# Patient Record
Sex: FEMALE | Race: WHITE | NOT HISPANIC OR LATINO | Employment: OTHER | URBAN - METROPOLITAN AREA
[De-identification: names, ages, dates, MRNs, and addresses within clinical notes are randomized per-mention and may not be internally consistent; named-entity substitution may affect disease eponyms.]

---

## 2017-02-02 ENCOUNTER — COMMUNICATION - HEALTHEAST (OUTPATIENT)
Dept: CARDIOLOGY | Facility: CLINIC | Age: 72
End: 2017-02-02

## 2017-02-20 ENCOUNTER — COMMUNICATION - HEALTHEAST (OUTPATIENT)
Dept: CARDIOLOGY | Facility: CLINIC | Age: 72
End: 2017-02-20

## 2017-02-20 DIAGNOSIS — I48.0 PAROXYSMAL ATRIAL FIBRILLATION (H): ICD-10-CM

## 2017-02-23 ENCOUNTER — AMBULATORY - HEALTHEAST (OUTPATIENT)
Dept: CARDIOLOGY | Facility: CLINIC | Age: 72
End: 2017-02-23

## 2017-03-16 ENCOUNTER — OFFICE VISIT - HEALTHEAST (OUTPATIENT)
Dept: CARDIOLOGY | Facility: CLINIC | Age: 72
End: 2017-03-16

## 2017-03-16 DIAGNOSIS — R07.2 PRECORDIAL PAIN: ICD-10-CM

## 2017-03-16 DIAGNOSIS — I48.0 PAROXYSMAL ATRIAL FIBRILLATION (H): ICD-10-CM

## 2017-05-17 ENCOUNTER — COMMUNICATION - HEALTHEAST (OUTPATIENT)
Dept: CARDIOLOGY | Facility: CLINIC | Age: 72
End: 2017-05-17

## 2017-05-20 ENCOUNTER — COMMUNICATION - HEALTHEAST (OUTPATIENT)
Dept: CARDIOLOGY | Facility: CLINIC | Age: 72
End: 2017-05-20

## 2017-05-20 DIAGNOSIS — I48.0 PAROXYSMAL ATRIAL FIBRILLATION (H): ICD-10-CM

## 2017-12-13 ENCOUNTER — COMMUNICATION - HEALTHEAST (OUTPATIENT)
Dept: CARDIOLOGY | Facility: CLINIC | Age: 72
End: 2017-12-13

## 2017-12-13 DIAGNOSIS — I48.0 PAROXYSMAL ATRIAL FIBRILLATION (H): ICD-10-CM

## 2018-03-07 ENCOUNTER — COMMUNICATION - HEALTHEAST (OUTPATIENT)
Dept: ADMINISTRATIVE | Facility: CLINIC | Age: 73
End: 2018-03-07

## 2018-03-15 ENCOUNTER — COMMUNICATION - HEALTHEAST (OUTPATIENT)
Dept: CARDIOLOGY | Facility: CLINIC | Age: 73
End: 2018-03-15

## 2018-03-15 DIAGNOSIS — I48.0 PAROXYSMAL ATRIAL FIBRILLATION (H): ICD-10-CM

## 2018-06-14 ENCOUNTER — OFFICE VISIT - HEALTHEAST (OUTPATIENT)
Dept: CARDIOLOGY | Facility: CLINIC | Age: 73
End: 2018-06-14

## 2018-06-14 ENCOUNTER — COMMUNICATION - HEALTHEAST (OUTPATIENT)
Dept: CARDIOLOGY | Facility: CLINIC | Age: 73
End: 2018-06-14

## 2018-06-14 DIAGNOSIS — I49.3 PVC'S (PREMATURE VENTRICULAR CONTRACTIONS): ICD-10-CM

## 2018-06-14 DIAGNOSIS — I05.9 MITRAL VALVE DISORDER: ICD-10-CM

## 2018-06-14 DIAGNOSIS — I48.0 PAROXYSMAL ATRIAL FIBRILLATION (H): ICD-10-CM

## 2018-06-14 ASSESSMENT — MIFFLIN-ST. JEOR: SCORE: 1137.72

## 2018-09-10 ENCOUNTER — COMMUNICATION - HEALTHEAST (OUTPATIENT)
Dept: CARDIOLOGY | Facility: CLINIC | Age: 73
End: 2018-09-10

## 2018-09-10 DIAGNOSIS — I48.0 PAROXYSMAL ATRIAL FIBRILLATION (H): ICD-10-CM

## 2019-03-13 ENCOUNTER — COMMUNICATION - HEALTHEAST (OUTPATIENT)
Dept: CARDIOLOGY | Facility: CLINIC | Age: 74
End: 2019-03-13

## 2019-03-13 DIAGNOSIS — I48.0 PAROXYSMAL ATRIAL FIBRILLATION (H): ICD-10-CM

## 2019-03-15 ENCOUNTER — COMMUNICATION - HEALTHEAST (OUTPATIENT)
Dept: CARDIOLOGY | Facility: CLINIC | Age: 74
End: 2019-03-15

## 2019-03-15 DIAGNOSIS — I05.9 MITRAL VALVE DISORDER: ICD-10-CM

## 2019-03-15 DIAGNOSIS — I49.3 PVC'S (PREMATURE VENTRICULAR CONTRACTIONS): ICD-10-CM

## 2019-03-15 DIAGNOSIS — R00.2 PALPITATIONS: ICD-10-CM

## 2019-04-03 ENCOUNTER — HOSPITAL ENCOUNTER (OUTPATIENT)
Dept: CARDIOLOGY | Facility: CLINIC | Age: 74
Discharge: HOME | End: 2019-04-03
Attending: INTERNAL MEDICINE

## 2019-04-03 DIAGNOSIS — I49.3 PVC'S (PREMATURE VENTRICULAR CONTRACTIONS): ICD-10-CM

## 2019-04-03 DIAGNOSIS — R00.2 PALPITATIONS: ICD-10-CM

## 2019-04-03 DIAGNOSIS — I05.9 MITRAL VALVE DISORDER: ICD-10-CM

## 2019-04-03 LAB
CV STRESS CURRENT BP HE: NORMAL
CV STRESS CURRENT HR HE: 100
CV STRESS CURRENT HR HE: 101
CV STRESS CURRENT HR HE: 102
CV STRESS CURRENT HR HE: 102
CV STRESS CURRENT HR HE: 106
CV STRESS CURRENT HR HE: 117
CV STRESS CURRENT HR HE: 120
CV STRESS CURRENT HR HE: 121
CV STRESS CURRENT HR HE: 121
CV STRESS CURRENT HR HE: 124
CV STRESS CURRENT HR HE: 67
CV STRESS CURRENT HR HE: 69
CV STRESS CURRENT HR HE: 69
CV STRESS CURRENT HR HE: 70
CV STRESS CURRENT HR HE: 70
CV STRESS CURRENT HR HE: 72
CV STRESS CURRENT HR HE: 74
CV STRESS CURRENT HR HE: 75
CV STRESS CURRENT HR HE: 75
CV STRESS CURRENT HR HE: 79
CV STRESS CURRENT HR HE: 79
CV STRESS CURRENT HR HE: 83
CV STRESS CURRENT HR HE: 83
CV STRESS CURRENT HR HE: 90
CV STRESS CURRENT HR HE: 91
CV STRESS DEVIATION TIME HE: NORMAL
CV STRESS ECHO PERCENT HR HE: NORMAL
CV STRESS EXERCISE STAGE HE: NORMAL
CV STRESS FINAL RESTING BP HE: NORMAL
CV STRESS FINAL RESTING HR HE: 75
CV STRESS MAX HR HE: 126
CV STRESS MAX TREADMILL GRADE HE: 16
CV STRESS MAX TREADMILL SPEED HE: 4.2
CV STRESS PEAK DIA BP HE: NORMAL
CV STRESS PEAK SYS BP HE: NORMAL
CV STRESS PHASE HE: NORMAL
CV STRESS PROTOCOL HE: NORMAL
CV STRESS RESTING PT POSITION HE: NORMAL
CV STRESS RESTING PT POSITION HE: NORMAL
CV STRESS ST DEVIATION AMOUNT HE: NORMAL
CV STRESS ST DEVIATION ELEVATION HE: NORMAL
CV STRESS ST EVELATION AMOUNT HE: NORMAL
CV STRESS TEST TYPE HE: NORMAL
CV STRESS TOTAL STAGE TIME MIN 1 HE: NORMAL
STRESS ECHO BASELINE BP: NORMAL
STRESS ECHO BASELINE HR: 65
STRESS ECHO CALCULATED PERCENT HR: 86 %
STRESS ECHO LAST STRESS BP: NORMAL
STRESS ECHO LAST STRESS HR: 124
STRESS ECHO POST ESTIMATED WORKLOAD: 10.5
STRESS ECHO POST EXERCISE DUR MIN: 9
STRESS ECHO POST EXERCISE DUR SEC: 0
STRESS ECHO TARGET HR: 125

## 2019-05-10 ENCOUNTER — COMMUNICATION - HEALTHEAST (OUTPATIENT)
Dept: ADMINISTRATIVE | Facility: CLINIC | Age: 74
End: 2019-05-10

## 2019-09-12 ENCOUNTER — COMMUNICATION - HEALTHEAST (OUTPATIENT)
Dept: CARDIOLOGY | Facility: CLINIC | Age: 74
End: 2019-09-12

## 2019-09-12 ENCOUNTER — OFFICE VISIT - HEALTHEAST (OUTPATIENT)
Dept: CARDIOLOGY | Facility: CLINIC | Age: 74
End: 2019-09-12

## 2019-09-12 DIAGNOSIS — I48.0 PAROXYSMAL ATRIAL FIBRILLATION (H): ICD-10-CM

## 2019-09-12 DIAGNOSIS — R07.2 PRECORDIAL PAIN: ICD-10-CM

## 2019-09-12 DIAGNOSIS — I05.9 MITRAL VALVE DISORDER: ICD-10-CM

## 2019-09-12 DIAGNOSIS — I49.3 PVC'S (PREMATURE VENTRICULAR CONTRACTIONS): ICD-10-CM

## 2019-10-07 ENCOUNTER — COMMUNICATION - HEALTHEAST (OUTPATIENT)
Dept: CARDIOLOGY | Facility: CLINIC | Age: 74
End: 2019-10-07

## 2019-10-24 ENCOUNTER — HOSPITAL ENCOUNTER (OUTPATIENT)
Dept: CARDIOLOGY | Facility: CLINIC | Age: 74
Discharge: HOME | End: 2019-10-24
Attending: INTERNAL MEDICINE

## 2019-10-24 DIAGNOSIS — I48.0 PAROXYSMAL ATRIAL FIBRILLATION (H): ICD-10-CM

## 2019-11-06 ENCOUNTER — COMMUNICATION - HEALTHEAST (OUTPATIENT)
Dept: CARDIOLOGY | Facility: CLINIC | Age: 74
End: 2019-11-06

## 2019-12-03 ENCOUNTER — COMMUNICATION - HEALTHEAST (OUTPATIENT)
Dept: CARDIOLOGY | Facility: CLINIC | Age: 74
End: 2019-12-03

## 2019-12-03 DIAGNOSIS — I48.0 PAROXYSMAL ATRIAL FIBRILLATION (H): ICD-10-CM

## 2020-09-11 ENCOUNTER — COMMUNICATION - HEALTHEAST (OUTPATIENT)
Dept: CARDIOLOGY | Facility: CLINIC | Age: 75
End: 2020-09-11

## 2020-09-11 DIAGNOSIS — I48.0 PAROXYSMAL ATRIAL FIBRILLATION (H): ICD-10-CM

## 2020-10-19 ENCOUNTER — OFFICE VISIT - HEALTHEAST (OUTPATIENT)
Dept: CARDIOLOGY | Facility: CLINIC | Age: 75
End: 2020-10-19

## 2020-10-19 DIAGNOSIS — I48.0 PAROXYSMAL ATRIAL FIBRILLATION (H): ICD-10-CM

## 2020-10-19 DIAGNOSIS — I05.9 MITRAL VALVE DISORDER: ICD-10-CM

## 2020-10-19 DIAGNOSIS — R07.2 PRECORDIAL PAIN: ICD-10-CM

## 2020-10-19 ASSESSMENT — MIFFLIN-ST. JEOR: SCORE: 1146.79

## 2020-12-19 ENCOUNTER — COMMUNICATION - HEALTHEAST (OUTPATIENT)
Dept: CARDIOLOGY | Facility: CLINIC | Age: 75
End: 2020-12-19

## 2020-12-19 DIAGNOSIS — I48.0 PAROXYSMAL ATRIAL FIBRILLATION (H): ICD-10-CM

## 2020-12-21 ENCOUNTER — COMMUNICATION - HEALTHEAST (OUTPATIENT)
Dept: CARDIOLOGY | Facility: CLINIC | Age: 75
End: 2020-12-21

## 2020-12-21 DIAGNOSIS — I48.0 PAROXYSMAL ATRIAL FIBRILLATION (H): ICD-10-CM

## 2020-12-21 RX ORDER — ATENOLOL 50 MG/1
TABLET ORAL
Qty: 90 TABLET | Refills: 0 | Status: SHIPPED | OUTPATIENT
Start: 2020-12-21 | End: 2023-02-16

## 2020-12-21 RX ORDER — ATENOLOL 50 MG/1
50 TABLET ORAL DAILY
Qty: 90 TABLET | Refills: 2 | Status: SHIPPED | OUTPATIENT
Start: 2020-12-21 | End: 2021-11-18

## 2021-05-30 VITALS — WEIGHT: 147 LBS | BODY MASS INDEX: 25.23 KG/M2

## 2021-06-01 VITALS — WEIGHT: 145 LBS | BODY MASS INDEX: 24.75 KG/M2 | HEIGHT: 64 IN

## 2021-06-02 NOTE — TELEPHONE ENCOUNTER
Reviewed patient's ED record. Patient seen today @ [facility] for palpitations. She was discharged with instructions to call and see if she can move her appointment for HENRY up and to update cardiologist-- she is currently scheduled for 10/24/19 to have monitor placed. Patient states that she is leaving for vacation soon and isn't able to schedule for any sooner than 10/14/19.     Dr. Verduzco, could you review patient's ED record? She currently takes Atenolol 50 mg daily as well as Aspirin 325 mg. We will try to move her HENRY up. Do you have any other recommendations in the meantime? -alonsob

## 2021-06-02 NOTE — TELEPHONE ENCOUNTER
----- Message from Nataliya Caldwell sent at 10/7/2019  2:02 PM CDT -----  General phone call:  PATIENT WAS IN ED WITH IRREGULAR HEART BEATS, SHE HAS A HENRY SCHEDULED FOR 10-24-19, SHE WAS TOLD TO LET DR KIRKLAND KNOW THIS.   WITH AN IRREGULAR HEART BEAT AGAIN, SHE TOOK HER ANTENNAL LAST NIGHT, CAN SHE TAKE ANOTHER NOW?   ALSO CAN SHE TAKE ANOTHER BABY ASPRIN TO AVOID STROKE,   PLEASE CALL  Caller: PATIENT  Primary cardiologist: DR KIRKLAND  Detailed reason for call: SEE ABOVE  New or active symptoms? YES AND NO, STABLE AT HOSPITAL AND NOW IRREGULAR AGAIN, PLEASE CALL  Best phone number: [phone]  Best time to contact: ANY TIME  Ok to leave a detailed message? YES  Device? NO    Additional Info:

## 2021-06-02 NOTE — TELEPHONE ENCOUNTER
----- Message -----  From: Jimmy Verduzco MD  Sent: 10/7/2019   9:08 PM CDT  To: Jenna Shafer RN    Can take extra half of atenolol if she has more problems while away. ER records are comforting.    Thanks,    Jimmy

## 2021-06-03 VITALS
SYSTOLIC BLOOD PRESSURE: 104 MMHG | HEART RATE: 72 BPM | DIASTOLIC BLOOD PRESSURE: 70 MMHG | BODY MASS INDEX: 24.37 KG/M2 | WEIGHT: 142 LBS | RESPIRATION RATE: 12 BRPM

## 2021-06-03 NOTE — TELEPHONE ENCOUNTER
----- Message from Bobby Greenberg sent at 11/6/2019  9:53 AM CST -----  Regarding: HENRY technical difficulty  General phone call:    Caller: Pt    Primary cardiologist: Dr Verduzco     Detailed reason for call: Pt was wearing HENRY monitor - It was having technical difficulties - Pt wanted to make us aware she completed it and sent it in - but there may have been lack of data -   Please call Pt back with results if possible    New or active symptoms? na  Best phone number: [phone]  Best time to contact: any  Ok to leave a detailed message? yes  Device? no    Additional Info:

## 2021-06-03 NOTE — PROGRESS NOTES
Event monitor placed on pt on 10/24/19, but [employer] monitoring company had an IT issue for a few days during this time and pt's event monitor was never activated.  After [employer] (f[employer]) called patient and explained, asked if pt wanted to start monitoring period over, patient declined.  I am unlinking the order with her appointment.  [employer] ([employer]) will not charge her for the monitor.  DH

## 2021-06-03 NOTE — TELEPHONE ENCOUNTER
Called patient. Advised results not yet available and we will contact once they are. Patient verbalized understanding. No other questions at this time. -refugio

## 2021-06-05 VITALS
SYSTOLIC BLOOD PRESSURE: 122 MMHG | WEIGHT: 147 LBS | BODY MASS INDEX: 25.1 KG/M2 | DIASTOLIC BLOOD PRESSURE: 80 MMHG | RESPIRATION RATE: 12 BRPM | HEIGHT: 64 IN | HEART RATE: 68 BPM

## 2021-06-11 NOTE — TELEPHONE ENCOUNTER
----- Message from Monica Singh sent at 9/11/2020  3:17 PM CDT -----  General phone call:    Caller: Pt  Primary cardiologist: Dax  Detailed reason for call: Pt needs refill auth for Atenolol- [facility]- has an appt with Dr. Verduzco on 10/19  New or active symptoms?   Best phone number:   Best time to contact:   Ok to leave a detailed message?   Device?     Additional Info:       Follow up appt in place. Medication refilled to get her to this appt. -[facility]

## 2021-06-15 PROBLEM — R07.2 PRECORDIAL PAIN: Status: ACTIVE | Noted: 2017-03-16

## 2021-06-16 PROBLEM — I48.0 PAROXYSMAL ATRIAL FIBRILLATION (H): Status: ACTIVE | Noted: 2019-09-12

## 2021-06-16 PROBLEM — I49.3 PVC'S (PREMATURE VENTRICULAR CONTRACTIONS): Status: ACTIVE | Noted: 2018-06-14

## 2021-06-19 NOTE — LETTER
Letter by Jimmy Verduzco MD at      Author: Jimmy Verduzco MD Service: -- Author Type: --    Filed:  Encounter Date: 5/10/2019 Status: (Other)         Annamarie Calvin  [address]  [place] [address]      May 10, 2019      Dear Annamarie,    This letter is to remind you that you will be due for your follow up appointment with Dr. Jimmy Verduzco . To help ensure you are in the best health possible, a regular follow-up with your cardiologist is essential.     Please call our Patient Scheduling Line at [phone] to schedule your appointment at your earliest convenience.  If you have recently scheduled an appointment, please disregard this letter.    We look forward to seeing you again. As always, we are available at the number  above for any questions or concerns you may have.      Sincerely,     The Physicians and Staff of [facility]

## 2021-06-25 NOTE — TELEPHONE ENCOUNTER
----- Message from Jac Fernandez sent at 3/15/2019 10:08 AM CDT -----  Contact: Annamarie  General phone call:    Caller: Annamarie  Primary cardiologist: Dr. Verduzco  Detailed reason for call: Patient is wondering if she should have and Echo done, she mentioned that Dr. Verduzco and her discussed this at last visit  New or active symptoms? Active  Best phone number: [phone]  Best time to contact: anytime  Ok to leave a detailed message? yes  Device? no    Additional Info:

## 2021-06-25 NOTE — TELEPHONE ENCOUNTER
Patient calling to report she would like to proceed with stress echo that you recommended at last visit on 6/2018. Patient reports she has had issues with increased shortness of breath lately.    Dr. Verduzco, okay to order stress echo?

## 2021-06-25 NOTE — PROGRESS NOTES
Progress Notes by Jimmy Verduzco MD at 3/16/2017  4:00 PM     Author: Jimmy Verduzco MD Service: -- Author Type: Physician    Filed: 3/16/2017  5:06 PM Encounter Date: 3/16/2017 Status: Signed    : Jimmy Verduzco MD (Physician)           Click to link to [facility] HEART CARE NOTE    Thank you, Dr. Villanueva, for asking us to see Annamarie Calvin at the [facility].      Assessment/Recommendations   Patient with known distant history of atrial dysrhythmias was having more palpitations some dizzy episodes and even some chest discomfort at this time.  I think we should evaluate her with a 30 day event recorder to see what rhythm disturbance is that she is noting.  I would do a stress echocardiogram to evaluate for structural heart disease and myocardial ischemia.  We will get those in the near future and report back to her with the results and any further recommendations.         History of Present Illness    Ms. Annamarie Calvin is a 71 y.o. female with known history of atrial dysrhythmias as well as mitral insufficiency which was previously judged to be moderate.  She was feeling good and has undergone multiple eye surgeries of late but is also noted some episodes of quite dramatic dizziness.  She had 2 episodes both which lasted a couple minutes where she felt unstable on her feet, felt as if the room was spinning but was not nauseated.  It went away in a couple minutes.  She did not notice palpitations.  She had sold where she developed a cold sweat after getting up early in the morning and felt very lightheaded.  She laid down on the floor so that she would pass out and she laid there for about 10 minutes.  Did not have any chest discomfort but she did notice that her pulse was irregular at that time.    She denies orthopnea, paroxysmal nocturnal dyspnea, peripheral edema, syncope or near syncopal episodes.  She has experienced intermittent  episodes of chest discomfort where she feels a pressure in her front of her chest which can go towards her back.  He can also sometimes be felt in her teeth.  That typically brought on with physical activity.         Physical Examination Review of Systems   Vitals:    03/16/17 1627   BP: 128/74   Pulse: 70     Body mass index is 25.23 kg/(m^2).  Wt Readings from Last 3 Encounters:   03/16/17 147 lb (66.7 kg)   05/14/15 143 lb (64.9 kg)     General Appearance:   Alert, cooperative and in no acute distress.   ENT/Mouth: Oral mucuos membranes pink and moist .      EYES:  No scleral icterus.  Positive xanthelasma.    Neck: JVP normal. No Hepatojugular reflux. Thyroid not visualized   Chest/Lungs:   Lungs are clear to auscultation, equal chest wall expansion    Cardiovascular:   S1, S2 with 1 to 2/6 systolic murmur , no clicks or rubs. Brachial, radial  pulses are intact and symetric. No carotid bruits noted   Abdomen:  Nontender. BS+. No bruits.      Extremities: No cyanosis, clubbing or edema   Skin: no xanthelasma, warm.    Psych: Appropriate affect.   Neurologic: normal gait, normal  bilateral, no tremors        General: WNL  Eyes: WNL  Ears/Nose/Throat: WNL  Lungs: Shortness of Breath  Heart: Irregular Heartbeat, Fainting  Stomach: WNL  Bladder: WNL  Muscle/Joints: WNL  Skin: WNL  Nervous System: Dizziness  Mental Health: WNL  Hormones: Negative  Blood: WNL     Medical History  Surgical History Family History Social History   No past medical history on file. No past surgical history on file. No family history on file. Social History     Social History   ? Marital status:      Spouse name: N/A   ? Number of children: N/A   ? Years of education: N/A     Occupational History   ? Not on file.     Social History Main Topics   ? Smoking status: Never Smoker   ? Smokeless tobacco: Never Used   ? Alcohol use Not on file   ? Drug use: Not on file   ? Sexual activity: Not on file     Other Topics Concern   ? Not on  file     Social History Narrative   ? No narrative on file          Medications  Allergies   Current Outpatient Prescriptions   Medication Sig Dispense Refill   ? aspirin 325 MG tablet Take 325 mg by mouth daily.     ? atenolol (TENORMIN) 50 MG tablet TAKE ONE TABLET BY MOUTH EVERY DAY 90 tablet 0   ? conjugated estrogens-medroxyprogesteron (PREMPHASE) 0.625 mg (14)/ 0.625mg-5mg(14) Tab Take 0.25 tablets by mouth daily.      ? ibuprofen (ADVIL,MOTRIN) 200 MG tablet Take 200 mg by mouth 2 (two) times a day.     ? VENTOLIN HFA 90 mcg/actuation inhaler        No current facility-administered medications for this visit.       Allergies   Allergen Reactions   ? Mold Other (See Comments) and Cough     Asthma.   ? Sulfamethoxazole-Trimethoprim Rash         Lab Results    Chemistry/lipid CBC Cardiac Enzymes/BNP/TSH/INR   No results found for: CHOL, HDL, LDLCALC, TRIG, CREATININE, BUN, K, NA, CL, CO2 No results found for: WBC, HGB, HCT, MCV, PLT No results found for: CKTOTAL, CKMB, CKMBINDEX, TROPONINI, BNP, TSH, INR

## 2021-06-25 NOTE — TELEPHONE ENCOUNTER
Order placed for stress echo per Dr. Verduzco. Message sent to scheduling to arrange test for patient. -ejb    ----- Message -----  From: Jimmy Verduzco MD  Sent: 3/16/2019   5:35 PM  To: Jenna Shafer RN    Yes please.     ThanksJimmy

## 2021-06-26 NOTE — PROGRESS NOTES
Progress Notes by Jimmy Verduzco MD at 6/14/2018  1:20 PM     Author: Jimmy Verduzco MD Service: -- Author Type: Physician    Filed: 6/14/2018  1:42 PM Encounter Date: 6/14/2018 Status: Signed    : Jimmy Verduzco MD (Physician)           Click to link to [facility] HEART CARE NOTE    Thank you, Dr. Villanueva, for asking us to see Annamarie Calvin at the [facility].      Assessment/Recommendations   Patient with known mild to moderate mitral insufficiency, and PVCs although is not been having any palpitations of late and believes her functional capacity is quite stable.  She may have slightly more shortness of breath with activity.  We have talked about doing a stress echocardiogram in the past and she is feeling like she does not need any further testing currently but will call us if her breathing gets worse or her palpitations get worse and will get a stress echocardiogram.    I have encouraged her to maintain an active lifestyle Reji she will.  We will see her back in 1 year, but of course would be happy to see her sooner if questions or problems arise.    Thank you for allowing us to participate in her care.         History of Present Illness    Ms. Annamarie Calvin is a 72 y.o. female with known history of premature ventricular contractions, had mild to moderate mitral regurgitation on prior echocardiogram.  She is felt well this past year.  She walks on a regular basis.  She does state that if she goes up and down the stairs multiple times she can get a little short of breath but she has some bronchospasm as well.  She thinks it may be slightly worse but not dramatically worsen and it does not impact her daily activities at all.  She denies orthopnea, paroxysmal nocturnal dyspnea, peripheral edema, syncope or near syncopal episodes.  She has not had any palpitations of late.         Physical Examination Review of Systems   Vitals:     06/14/18 1319   BP: 108/62   Pulse: 68   Resp: 12     Body mass index is 24.89 kg/(m^2).  Wt Readings from Last 3 Encounters:   06/14/18 145 lb (65.8 kg)   03/16/17 147 lb (66.7 kg)   05/14/15 143 lb (64.9 kg)     General Appearance:   Alert, cooperative and in no acute distress.   ENT/Mouth: Oral mucuos membranes pink and moist .      EYES:  No scleral icterus. No Xanthelasma.    Neck: JVP normal. No Hepatojugular reflux. Thyroid not visualized   Chest/Lungs:   Lungs are clear to auscultation, equal chest wall expansion    Cardiovascular:   S1, S2 with 1/6 systolic murmur , no clicks or rubs. Brachial, radial and posterior tibial pulses are intact and symetric. No carotid bruits noted   Abdomen:  Nontender. BS+.       Extremities: No cyanosis, clubbing or edema   Skin: no xanthelasma, warm.    Psych: Appropriate affect.   Neurologic: normal gait, normal  bilateral, no tremors        General: WNL  Eyes: WNL  Ears/Nose/Throat: WNL  Lungs: Shortness of Breath  Heart: WNL  Stomach: Diarrhea  Bladder: WNL  Muscle/Joints: WNL  Skin: WNL  Nervous System: WNL  Mental Health: WNL  Hormones: Negative  Blood: WNL     Medical History  Surgical History Family History Social History   No past medical history on file. No past surgical history on file. Family History   Problem Relation Age of Onset   ? Heart failure Mother    ? Atrial fibrillation Daughter     Social History     Social History   ? Marital status:      Spouse name: N/A   ? Number of children: N/A   ? Years of education: N/A     Occupational History   ? Not on file.     Social History Main Topics   ? Smoking status: Never Smoker   ? Smokeless tobacco: Never Used   ? Alcohol use Not on file   ? Drug use: Not on file   ? Sexual activity: Not on file     Other Topics Concern   ? Not on file     Social History Narrative          Medications  Allergies   Current Outpatient Prescriptions   Medication Sig Dispense Refill   ? aspirin 325 MG tablet Take 325 mg by  mouth daily.     ? atenolol (TENORMIN) 50 MG tablet TAKE ONE TABLET BY MOUTH EVERY DAY 90 tablet 0   ? ibuprofen (ADVIL,MOTRIN) 200 MG tablet Take 200 mg by mouth every 8 (eight) hours as needed.      ? VENTOLIN HFA 90 mcg/actuation inhaler        No current facility-administered medications for this visit.       Allergies   Allergen Reactions   ? Mold Other (See Comments) and Cough     Asthma.   ? Sulfamethoxazole-Trimethoprim Rash         Lab Results    Chemistry/lipid CBC Cardiac Enzymes/BNP/TSH/INR   No results found for: CHOL, HDL, LDLCALC, TRIG, CREATININE, BUN, K, NA, CL, CO2 No results found for: WBC, HGB, HCT, MCV, PLT No results found for: CKTOTAL, CKMB, CKMBINDEX, TROPONINI, BNP, TSH, INR

## 2021-06-28 NOTE — PROGRESS NOTES
Progress Notes by Jimmy Verduzco MD at 9/12/2019  1:40 PM     Author: Jimmy Verduzco MD Service: -- Author Type: Physician    Filed: 9/12/2019  2:04 PM Encounter Date: 9/12/2019 Status: Signed    : Jimmy Verduzco MD (Physician)           Click to link to [facility] NOTE    Thank you, Dr. Villanueva, for asking us to see Annamarie Calvin at the [facility].      Assessment/Recommendations   Patient with known history of mild to moderate mitral insufficiency which is stable but also has atrial fibrillation or presumed atrial fibrillation has been some time since we have captured anything on a monitor.  I would like to get a 2-week ACT monitor to see if she is having atrial fibrillation that is asymptomatic.  We can also evaluate for premature ventricular contractions which she has had in the past.    If she is having atrial fibrillation, we will need to discuss anticoagulation.  She does not have atrial fibrillation in the 2-week monitor we could consider getting some type of temporary monitor such as a Apple 4 watch or fit bit to see if we could get a better idea if she is having it more infrequently and for how long.  It may warrant a visit in our atrial fibrillation clinic as well.    Thank you for allowing us to participate in her care.         History of Present Illness    Ms. Annamarie Calvin is a 74 y.o. female with known history of premature ventricular contractions, mild to moderate mitral insufficiency, and paroxysmal atrial fibrillation.  We had encouraged her to have repeat echocardiogram or stress echocardiogram for some time and earlier this year she had a stress echocardiogram, going 9 minutes on a Nahun protocol without evidence of myocardial ischemia.  She stopped because of shortness of breath and did not have chest discomfort.  She had mild to moderate mitral insufficiency on the scans and did not have any evidence of  myocardial ischemia with normal and appropriate improved contractility of the left ventricle after exercise.  She does not really feel like she gets short of breath and also denies orthopnea, paroxysmal nocturnal dyspnea, peripheral edema, syncope or near syncopal episodes.  She does occasionally get atrial fibrillation.  She reports that as a irregular heartbeat and sometimes can feel little fatigued with it.  It would generally last just a few minutes but she had one recently that lasted a few hours.  She feels like she is having more stress related to some problematic relatives and she feels like this might be making it act up a bit.  She takes an aspirin today and she is not eager to take anticoagulants.  She has a chads 2 vascular score of 2 getting one point for being female and one point for being over 65.       Physical Examination Review of Systems   Vitals:    09/12/19 1331   BP: 104/70   Pulse: 72   Resp: 12     Body mass index is 24.37 kg/m .  Wt Readings from Last 3 Encounters:   09/12/19 142 lb (64.4 kg)   06/14/18 145 lb (65.8 kg)   03/16/17 147 lb (66.7 kg)     General Appearance:   Alert, cooperative and in no acute distress.   ENT/Mouth: Oral mucuos membranes pink and moist .      EYES:  No scleral icterus. No Xanthelasma.    Neck: JVP normal. No Hepatojugular reflux. Thyroid not visualized   Chest/Lungs:   Lungs are clear to auscultation, equal chest wall expansion    Cardiovascular:   S1, S2 without murmur ,clicks or rubs. Brachial, radial and posterior tibial pulses are intact and symetric. No carotid bruits noted   Abdomen:  Nontender. BS+.       Extremities: No cyanosis, clubbing or edema   Skin: no xanthelasma, warm.    Psych: Appropriate affect.   Neurologic: normal gait, normal  bilateral, no tremors        General: WNL  Eyes: WNL  Ears/Nose/Throat: WNL  Lungs: Shortness of Breath, Wheezing  Heart: Shortness of Breath with activity, Irregular Heartbeat  Stomach: WNL  Bladder:  WNL  Muscle/Joints: Joint Pain  Skin: WNL  Nervous System: WNL  Mental Health: WNL  Hormones: Negative  Blood: WNL     Medical History  Surgical History Family History Social History   No past medical history on file. No past surgical history on file. Family History   Problem Relation Age of Onset   ? Heart failure Mother    ? Atrial fibrillation Daughter     Social History     Socioeconomic History   ? Marital status:      Spouse name: Not on file   ? Number of children: Not on file   ? Years of education: Not on file   ? Highest education level: Not on file   Occupational History   ? Not on file   Social Needs   ? Financial resource strain: Not on file   ? Food insecurity:     Worry: Not on file     Inability: Not on file   ? Transportation needs:     Medical: Not on file     Non-medical: Not on file   Tobacco Use   ? Smoking status: Never Smoker   ? Smokeless tobacco: Never Used   Substance and Sexual Activity   ? Alcohol use: Not on file   ? Drug use: Not on file   ? Sexual activity: Not on file   Lifestyle   ? Physical activity:     Days per week: Not on file     Minutes per session: Not on file   ? Stress: Not on file   Relationships   ? Social connections:     Talks on phone: Not on file     Gets together: Not on file     Attends Yazidism service: Not on file     Active member of club or organization: Not on file     Attends meetings of clubs or organizations: Not on file     Relationship status: Not on file   ? Intimate partner violence:     Fear of current or ex partner: Not on file     Emotionally abused: Not on file     Physically abused: Not on file     Forced sexual activity: Not on file   Other Topics Concern   ? Not on file   Social History Narrative   ? Not on file          Medications  Allergies   Current Outpatient Medications   Medication Sig Dispense Refill   ? aspirin 325 MG tablet Take 325 mg by mouth daily.     ? atenolol (TENORMIN) 50 MG tablet TAKE ONE TABLET BY MOUTH EVERY DAY 90  tablet 0   ? ibuprofen (ADVIL,MOTRIN) 200 MG tablet Take 200 mg by mouth every 8 (eight) hours as needed.      ? VENTOLIN HFA 90 mcg/actuation inhaler        No current facility-administered medications for this visit.       Allergies   Allergen Reactions   ? Mold Other (See Comments) and Cough     Asthma.   ? Sulfamethoxazole-Trimethoprim Rash         Lab Results    Chemistry/lipid CBC Cardiac Enzymes/BNP/TSH/INR   No results found for: CHOL, HDL, LDLCALC, TRIG, CREATININE, BUN, K, NA, CL, CO2 No results found for: WBC, HGB, HCT, MCV, PLT No results found for: CKTOTAL, CKMB, CKMBINDEX, TROPONINI, BNP, TSH, INR

## 2021-06-29 NOTE — PROGRESS NOTES
Progress Notes by Jimmy Verduzco MD at 10/19/2020 12:50 PM     Author: Jimmy Verduzco MD Service: -- Author Type: Physician    Filed: 10/19/2020  1:16 PM Encounter Date: 10/19/2020 Status: Signed    : Jimmy Verduzco MD (Physician)               Assessment/Recommendations   Patient with known mitral insufficiency which was felt to be mild to moderate in the past.  The murmur is not worrisome today and her functional capacity is quite stable.  She also has had rare documentation of atrial fibrillation.  She feels like she has not had any episodes in the last 6 months.  We tried to put a 2-week ACT monitor on her last year but it did not work and she sent it back and decided not to do it.  We talked about doing that again but she would like to forego that unless she has recurrent symptoms.  I have asked her to call in if she has recurrent palpitations that are more than just fleeting and we will set her up for a 2-week ACT monitor.    We will see her back in 1 year, but of course would be happy to see her sooner if questions or problems arise.    Thank you for allowing us to participate in her care.       History of Present Illness/Subjective    Ms. Annamarie Calvin is a 75 y.o. female with known history of mild to moderate mitral insufficiency as well as paroxysmal atrial fibrillation, precordial chest discomfort, and PVCs.  She has had a good year.  She had her left knee replaced and several years ago had had her right knee replaced.  The knee replacement went well.  Her functional capacity is good.  She rode a 3 wheeled bike a lot this summer and has not had any chest discomfort or unusual shortness of breath with physical activity.  She will get some chest discomfort when she is resting and this has been going on since she was age 39.  We did a stress echocardiogram last year which was normal.    She denies orthopnea, paroxysmal nocturnal dyspnea, peripheral edema, syncope or near syncopal  episodes.  She had a episode of benign positional vertigo which was awful but it resolved.    .       Physical Examination Review of Systems   Vitals:    10/19/20 1256   BP: 122/80   Pulse: 68   Resp: 12     Body mass index is 25.23 kg/m .  Wt Readings from Last 3 Encounters:   10/19/20 147 lb (66.7 kg)   09/12/19 142 lb (64.4 kg)   06/14/18 145 lb (65.8 kg)     General Appearance:   Alert, cooperative and in no acute distress.   ENT/Mouth: Oral mucuos membranes pink and moist .      EYES:  no scleral icterus, normal conjunctivae   Neck: JVP normal. No Hepatojugular reflux. Thyroid not visualized.   Chest/Lungs:   Lungs are clear to auscultation, equal chest wall expansion.   Cardiovascular:   S1, S2 with 1/6 systolic murmur , no clicks or rubs. Brachial, radial and posterior tibial pulses are intact and symetric. No carotid bruits noted   Abdomen:  Nontender. BS+.    Extremities: No cyanosis, clubbing or edema   Skin: no xanthelasma, warm.    Neurologic: normal arm motion bilateral, no tremors     Psychiatric: Appropriate affect.      General: WNL  Eyes: WNL  Ears/Nose/Throat: WNL  Lungs: WNL  Heart: Shortness of Breath with activity  Stomach: WNL  Bladder: WNL  Muscle/Joints: WNL  Skin: WNL  Nervous System: Dizziness  Mental Health: WNL     Blood: WNL       Medical History  Surgical History Family History Social History   No past medical history on file. No past surgical history on file. Family History   Problem Relation Age of Onset   ? Heart failure Mother    ? Atrial fibrillation Daughter     Social History     Socioeconomic History   ? Marital status:      Spouse name: Not on file   ? Number of children: Not on file   ? Years of education: Not on file   ? Highest education level: Not on file   Occupational History   ? Not on file   Social Needs   ? Financial resource strain: Not on file   ? Food insecurity     Worry: Not on file     Inability: Not on file   ? Transportation needs     Medical: Not on  file     Non-medical: Not on file   Tobacco Use   ? Smoking status: Never Smoker   ? Smokeless tobacco: Never Used   Substance and Sexual Activity   ? Alcohol use: Not on file   ? Drug use: Not on file   ? Sexual activity: Not on file   Lifestyle   ? Physical activity     Days per week: Not on file     Minutes per session: Not on file   ? Stress: Not on file   Relationships   ? Social connections     Talks on phone: Not on file     Gets together: Not on file     Attends Mandaen service: Not on file     Active member of club or organization: Not on file     Attends meetings of clubs or organizations: Not on file     Relationship status: Not on file   ? Intimate partner violence     Fear of current or ex partner: Not on file     Emotionally abused: Not on file     Physically abused: Not on file     Forced sexual activity: Not on file   Other Topics Concern   ? Not on file   Social History Narrative   ? Not on file          Medications  Allergies   Current Outpatient Medications   Medication Sig Dispense Refill   ? aspirin 325 MG tablet Take 325 mg by mouth daily.     ? atenoloL (TENORMIN) 50 MG tablet Take 1 tablet (50 mg total) by mouth daily. 90 tablet 0   ? VENTOLIN HFA 90 mcg/actuation inhaler      ? ibuprofen (ADVIL,MOTRIN) 200 MG tablet Take 200 mg by mouth every 8 (eight) hours as needed.        No current facility-administered medications for this visit.     Allergies   Allergen Reactions   ? Mold Other (See Comments) and Cough     Asthma.   ? Sulfamethoxazole-Trimethoprim Rash         Lab Results    Chemistry/lipid CBC Cardiac Enzymes/BNP/TSH/INR   No results found for: CHOL, HDL, LDLCALC, TRIG, CREATININE, BUN, K, NA, CL, CO2 No results found for: WBC, HGB, HCT, MCV, PLT No results found for: CKTOTAL, CKMB, CKMBINDEX, TROPONINI, BNP, TSH, INR

## 2021-11-18 ENCOUNTER — OFFICE VISIT (OUTPATIENT)
Dept: CARDIOLOGY | Facility: CLINIC | Age: 76
End: 2021-11-18
Payer: MEDICARE

## 2021-11-18 VITALS
WEIGHT: 146.6 LBS | DIASTOLIC BLOOD PRESSURE: 62 MMHG | HEIGHT: 64 IN | BODY MASS INDEX: 25.03 KG/M2 | TEMPERATURE: 98.5 F | OXYGEN SATURATION: 96 % | SYSTOLIC BLOOD PRESSURE: 102 MMHG | RESPIRATION RATE: 12 BRPM | HEART RATE: 61 BPM

## 2021-11-18 DIAGNOSIS — I48.0 PAROXYSMAL ATRIAL FIBRILLATION (H): ICD-10-CM

## 2021-11-18 DIAGNOSIS — I05.9 MITRAL VALVE DISORDER: Primary | ICD-10-CM

## 2021-11-18 PROCEDURE — 99213 OFFICE O/P EST LOW 20 MIN: CPT | Performed by: INTERNAL MEDICINE

## 2021-11-18 RX ORDER — ATENOLOL 50 MG/1
50 TABLET ORAL DAILY
Qty: 90 TABLET | Refills: 3 | Status: SHIPPED | OUTPATIENT
Start: 2021-11-18 | End: 2022-12-28

## 2021-11-18 ASSESSMENT — MIFFLIN-ST. JEOR: SCORE: 1139.97

## 2021-11-18 NOTE — LETTER
"11/18/2021    Honey Galvez MD  [facility] [address]  [facility] [address]    RE: Annamarie Calvin       Dear Colleague,    I had the pleasure of seeing Annamarie Calvin in the [facility].        [facility]  [phone]          Assessment/Recommendations   Patient with known history of mild to moderate mitral insufficiency and palpitations all of which is stable.  She has not had any palpitations suggestive of atrial fibrillation for over a year.  Her functional capacity is good and she continues to exercise on a near daily basis.    I am not changing of her medications today.  Is been 2 years since we evaluate her mitral valve so we will get an echocardiogram.    We will see her back in 1 year, but of course would be happy to see her sooner if questions or problems arise.    Thank you for allowing us to participate in her care.       History of Present Illness/Subjective    Ms. Annamarie Calvin is a 76 year old female with known mitral insufficiency as well as a distant history of atrial fibrillation.  She has felt well this past year and feels like she had one of her better years.  She had a vertiginous episode which lasted several days but this past and is not returned.  She gets occasional palpitations but they are not not lengthy and not likely atrial fibrillation.  She has no syncope or near syncopal episodes and denies orthopnea, paroxysmal nocturnal dyspnea, chest discomfort, peripheral edema.    She  walks on an almost daily basis without difficulty.       Physical Examination Review of Systems   /62 (BP Location: Left arm, Patient Position: Sitting, Cuff Size: Adult Large)   Pulse 61   Temp 98.5  F (36.9  C) (Oral)   Resp 12   Ht 1.626 m (5' 4\")   Wt 66.5 kg (146 lb 9.6 oz)   LMP  (LMP Unknown)   SpO2 96%   Breastfeeding No   BMI 25.16 kg/m    Body mass index is 25.16 " "kg/m .  Wt Readings from Last 3 Encounters:   11/18/21 66.5 kg (146 lb 9.6 oz)   10/19/20 66.7 kg (147 lb)   09/12/19 64.4 kg (142 lb)     General Appearance:   Alert, cooperative and in no acute distress.   ENT/Mouth: Patient wearing a mask.      EYES:  no scleral icterus, normal conjunctivae   Neck: JVP normal. No Hepatojugular reflux. Thyroid not visualized.   Chest/Lungs:   Lungs are clear to auscultation, equal chest wall expansion.   Cardiovascular:   S1, S2 with 1/6 systolic murmur , no clicks or rubs. Brachial, radial  pulses are intact and symetric. No carotid bruits noted   Abdomen:  Nontender. BS+.    Extremities: No cyanosis, clubbing or edema   Skin: no xanthelasma, warm.    Neurologic: normal arm movement bilateral, no tremors     Psychiatric: Appropriate affect.      Enc Vitals  BP: 102/62  Pulse: 61  Resp: 12  Temp: 98.5  F (36.9  C)  Temp src: Oral  SpO2: 96 %  Weight: 66.5 kg (146 lb 9.6 oz)  Height: 162.6 cm (5' 4\")                                           Medical History  Surgical History Family History Social History   No past medical history on file. No past surgical history on file. Family History   Problem Relation Age of Onset     Heart Failure Mother      Atrial fibrillation Daughter     Social History     Socioeconomic History     Marital status:      Spouse name: Not on file     Number of children: Not on file     Years of education: Not on file     Highest education level: Not on file   Occupational History     Not on file   Tobacco Use     Smoking status: Never Smoker     Smokeless tobacco: Never Used   Substance and Sexual Activity     Alcohol use: Not on file     Drug use: Not on file     Sexual activity: Not on file   Other Topics Concern     Not on file   Social History Narrative     Not on file     Social Determinants of Health     Financial Resource Strain: Not on file   Food Insecurity: Not on file   Transportation Needs: Not on file   Physical Activity: Not on file "   Stress: Not on file   Social Connections: Not on file   Intimate Partner Violence: Not on file   Housing Stability: Not on file          Medications  Allergies   Current Outpatient Medications   Medication Sig Dispense Refill     aspirin 325 MG tablet [ASPIRIN 325 MG TABLET] Take 325 mg by mouth daily.       atenoloL (TENORMIN) 50 MG tablet [ATENOLOL (TENORMIN) 50 MG TABLET] Take 1 tablet (50 mg total) by mouth daily. 90 tablet 2     atenoloL (TENORMIN) 50 MG tablet [ATENOLOL (TENORMIN) 50 MG TABLET] TAKE ONE TABLET BY MOUTH EVERY DAY 90 tablet 0     VENTOLIN HFA 90 mcg/actuation inhaler [VENTOLIN HFA 90 MCG/ACTUATION INHALER]        ibuprofen (ADVIL,MOTRIN) 200 MG tablet [IBUPROFEN (ADVIL,MOTRIN) 200 MG TABLET] Take 200 mg by mouth every 8 (eight) hours as needed.  (Patient not taking: Reported on 11/18/2021)      Allergies   Allergen Reactions     Mold [Molds & Smuts] Other (See Comments) and Cough     Asthma.     Sulfamethoxazole-Trimethoprim [Sulfamethoxazole W/Trimethoprim] Rash         Lab Results    Chemistry/lipid CBC Cardiac Enzymes/BNP/TSH/INR   No results found for: CHOL, HDL, TRIG, CREATININE, BUN, NA, CO2 No results found for: WBC, HGB, HCT, MCV, PLT No results found for: CKTOTAL, CKMB, TROPONINI, BNP, TSH, INR             Thank you for allowing me to participate in the care of your patient.      Sincerely,     Jimmy Verduzco MD     [facility]  cc:   No referring provider defined for this encounter.

## 2021-11-18 NOTE — PROGRESS NOTES
"[facility]  [phone]          Assessment/Recommendations   Patient with known history of mild to moderate mitral insufficiency and palpitations all of which is stable.  She has not had any palpitations suggestive of atrial fibrillation for over a year.  Her functional capacity is good and she continues to exercise on a near daily basis.    I am not changing of her medications today.  Is been 2 years since we evaluate her mitral valve so we will get an echocardiogram.    We will see her back in 1 year, but of course would be happy to see her sooner if questions or problems arise.    Thank you for allowing us to participate in her care.       History of Present Illness/Subjective    Ms. Annamarie Calvin is a 76 year old female with known mitral insufficiency as well as a distant history of atrial fibrillation.  She has felt well this past year and feels like she had one of her better years.  She had a vertiginous episode which lasted several days but this past and is not returned.  She gets occasional palpitations but they are not not lengthy and not likely atrial fibrillation.  She has no syncope or near syncopal episodes and denies orthopnea, paroxysmal nocturnal dyspnea, chest discomfort, peripheral edema.    She  walks on an almost daily basis without difficulty.       Physical Examination Review of Systems   /62 (BP Location: Left arm, Patient Position: Sitting, Cuff Size: Adult Large)   Pulse 61   Temp 98.5  F (36.9  C) (Oral)   Resp 12   Ht 1.626 m (5' 4\")   Wt 66.5 kg (146 lb 9.6 oz)   LMP  (LMP Unknown)   SpO2 96%   Breastfeeding No   BMI 25.16 kg/m    Body mass index is 25.16 kg/m .  Wt Readings from Last 3 Encounters:   11/18/21 66.5 kg (146 lb 9.6 oz)   10/19/20 66.7 kg (147 lb)   09/12/19 64.4 kg (142 lb)     General Appearance:   Alert, cooperative and in no acute distress.   ENT/Mouth: Patient wearing a mask.      EYES:  no scleral icterus, normal " "conjunctivae   Neck: JVP normal. No Hepatojugular reflux. Thyroid not visualized.   Chest/Lungs:   Lungs are clear to auscultation, equal chest wall expansion.   Cardiovascular:   S1, S2 with 1/6 systolic murmur , no clicks or rubs. Brachial, radial  pulses are intact and symetric. No carotid bruits noted   Abdomen:  Nontender. BS+.    Extremities: No cyanosis, clubbing or edema   Skin: no xanthelasma, warm.    Neurologic: normal arm movement bilateral, no tremors     Psychiatric: Appropriate affect.      Enc Vitals  BP: 102/62  Pulse: 61  Resp: 12  Temp: 98.5  F (36.9  C)  Temp src: Oral  SpO2: 96 %  Weight: 66.5 kg (146 lb 9.6 oz)  Height: 162.6 cm (5' 4\")                                           Medical History  Surgical History Family History Social History   No past medical history on file. No past surgical history on file. Family History   Problem Relation Age of Onset     Heart Failure Mother      Atrial fibrillation Daughter     Social History     Socioeconomic History     Marital status:      Spouse name: Not on file     Number of children: Not on file     Years of education: Not on file     Highest education level: Not on file   Occupational History     Not on file   Tobacco Use     Smoking status: Never Smoker     Smokeless tobacco: Never Used   Substance and Sexual Activity     Alcohol use: Not on file     Drug use: Not on file     Sexual activity: Not on file   Other Topics Concern     Not on file   Social History Narrative     Not on file     Social Determinants of Health     Financial Resource Strain: Not on file   Food Insecurity: Not on file   Transportation Needs: Not on file   Physical Activity: Not on file   Stress: Not on file   Social Connections: Not on file   Intimate Partner Violence: Not on file   Housing Stability: Not on file          Medications  Allergies   Current Outpatient Medications   Medication Sig Dispense Refill     aspirin 325 MG tablet [ASPIRIN 325 MG TABLET] Take 325 " mg by mouth daily.       atenoloL (TENORMIN) 50 MG tablet [ATENOLOL (TENORMIN) 50 MG TABLET] Take 1 tablet (50 mg total) by mouth daily. 90 tablet 2     atenoloL (TENORMIN) 50 MG tablet [ATENOLOL (TENORMIN) 50 MG TABLET] TAKE ONE TABLET BY MOUTH EVERY DAY 90 tablet 0     VENTOLIN HFA 90 mcg/actuation inhaler [VENTOLIN HFA 90 MCG/ACTUATION INHALER]        ibuprofen (ADVIL,MOTRIN) 200 MG tablet [IBUPROFEN (ADVIL,MOTRIN) 200 MG TABLET] Take 200 mg by mouth every 8 (eight) hours as needed.  (Patient not taking: Reported on 11/18/2021)      Allergies   Allergen Reactions     Mold [Molds & Smuts] Other (See Comments) and Cough     Asthma.     Sulfamethoxazole-Trimethoprim [Sulfamethoxazole W/Trimethoprim] Rash         Lab Results    Chemistry/lipid CBC Cardiac Enzymes/BNP/TSH/INR   No results found for: CHOL, HDL, TRIG, CREATININE, BUN, NA, CO2 No results found for: WBC, HGB, HCT, MCV, PLT No results found for: CKTOTAL, CKMB, TROPONINI, BNP, TSH, INR

## 2021-12-02 ENCOUNTER — HOSPITAL ENCOUNTER (OUTPATIENT)
Dept: CARDIOLOGY | Facility: CLINIC | Age: 76
Discharge: HOME | End: 2021-12-02
Attending: INTERNAL MEDICINE | Admitting: INTERNAL MEDICINE
Payer: MEDICARE

## 2021-12-02 DIAGNOSIS — I05.9 MITRAL VALVE DISORDER: ICD-10-CM

## 2021-12-02 DIAGNOSIS — I48.0 PAROXYSMAL ATRIAL FIBRILLATION (H): ICD-10-CM

## 2021-12-02 LAB — LVEF ECHO: NORMAL

## 2021-12-02 PROCEDURE — 93306 TTE W/DOPPLER COMPLETE: CPT | Mod: 26 | Performed by: INTERNAL MEDICINE

## 2021-12-02 PROCEDURE — 93306 TTE W/DOPPLER COMPLETE: CPT

## 2022-09-26 ENCOUNTER — TRANSFERRED RECORDS (OUTPATIENT)
Dept: HEALTH INFORMATION MANAGEMENT | Facility: CLINIC | Age: 77
End: 2022-09-26

## 2022-12-28 DIAGNOSIS — I48.0 PAROXYSMAL ATRIAL FIBRILLATION (H): Primary | ICD-10-CM

## 2022-12-28 DIAGNOSIS — I05.9 MITRAL VALVE DISORDER: ICD-10-CM

## 2023-02-16 ENCOUNTER — OFFICE VISIT (OUTPATIENT)
Dept: CARDIOLOGY | Facility: CLINIC | Age: 78
End: 2023-02-16
Attending: INTERNAL MEDICINE
Payer: MEDICARE

## 2023-02-16 VITALS
DIASTOLIC BLOOD PRESSURE: 70 MMHG | TEMPERATURE: 98.1 F | BODY MASS INDEX: 26.33 KG/M2 | HEIGHT: 63 IN | WEIGHT: 148.6 LBS | OXYGEN SATURATION: 98 % | SYSTOLIC BLOOD PRESSURE: 124 MMHG | RESPIRATION RATE: 20 BRPM | HEART RATE: 62 BPM

## 2023-02-16 DIAGNOSIS — I05.9 MITRAL VALVE DISORDER: ICD-10-CM

## 2023-02-16 DIAGNOSIS — I48.0 PAROXYSMAL ATRIAL FIBRILLATION (H): ICD-10-CM

## 2023-02-16 PROBLEM — K40.90 RIGHT INGUINAL HERNIA: Status: ACTIVE | Noted: 2022-08-09

## 2023-02-16 PROCEDURE — 99213 OFFICE O/P EST LOW 20 MIN: CPT | Performed by: INTERNAL MEDICINE

## 2023-02-16 RX ORDER — GABAPENTIN 100 MG/1
CAPSULE ORAL
COMMUNITY
Start: 2022-04-25 | End: 2024-05-17

## 2023-02-16 RX ORDER — B-COMPLEX WITH VITAMIN C
1 TABLET ORAL
COMMUNITY

## 2023-02-16 RX ORDER — CHLORAL HYDRATE 500 MG
2 CAPSULE ORAL DAILY
COMMUNITY

## 2023-02-16 RX ORDER — IBUPROFEN 200 MG
200 TABLET ORAL
COMMUNITY

## 2023-02-16 RX ORDER — OXYCODONE HYDROCHLORIDE 5 MG/1
2.5-5 TABLET ORAL
COMMUNITY
Start: 2022-09-28 | End: 2024-05-17

## 2023-02-16 RX ORDER — UREA 10 %
500 LOTION (ML) TOPICAL 2 TIMES DAILY
COMMUNITY

## 2023-02-16 RX ORDER — FLAVORING AGENT
OIL (ML) MISCELLANEOUS
COMMUNITY

## 2023-02-16 RX ORDER — CALCIUM CARBONATE 500(1250)
1 TABLET ORAL 2 TIMES DAILY
COMMUNITY

## 2023-02-16 RX ORDER — ATENOLOL 50 MG/1
50 TABLET ORAL DAILY
Qty: 90 TABLET | Refills: 3 | Status: SHIPPED | OUTPATIENT
Start: 2023-02-16 | End: 2024-03-29

## 2023-02-16 ASSESSMENT — PAIN SCALES - GENERAL: PAINLEVEL: NO PAIN (0)

## 2023-02-16 NOTE — PROGRESS NOTES
"    [facility]  [phone]          Assessment/Recommendations   Patient with known history of mitral insufficiency which on last echocardiogram was judged to be mild.  She is also had palpitations and a distant history of atrial fibrillation but no symptoms of palpitation and she actually feels quite well.  She is starting a walking program and can do 2 miles without difficulty.    I have not made any changes in her current medications.  I have encouraged her to maintain an active lifestyle and to call if she has changes in her functional capacity.  We will see her back in 1 year and renew her atenolol today.    Thank you for allowing us to participate in her care.       History of Present Illness/Subjective    Ms. Annamarie Calvin is a 77 year old female with known history of mitral insufficiency, and a distant history of atrial fibrillation although no documentation for quite some time.  She has not had any significant palpitations and she feels like her palpitations are just gone away.  She feels like she has a lot of energy and did have a period in the fall where she felt more fatigued for couple months.  Thereafter she was documented with COVID but had a quick recovery after taking an antiviral.    She denies orthopnea, paroxysmal nocturnal dyspnea, peripheral edema, syncope or near syncopal episodes and does not have any chest or arm discomfort.       Physical Examination Review of Systems   /70 (BP Location: Right arm, Patient Position: Sitting, Cuff Size: Adult Regular)   Pulse 62   Temp 98.1  F (36.7  C) (Oral)   Resp 20   Ht 1.604 m (5' 3.15\")   Wt 67.4 kg (148 lb 9.6 oz)   LMP  (LMP Unknown)   SpO2 98%   BMI 26.20 kg/m    Body mass index is 26.2 kg/m .  Wt Readings from Last 3 Encounters:   02/16/23 67.4 kg (148 lb 9.6 oz)   11/18/21 66.5 kg (146 lb 9.6 oz)   10/19/20 66.7 kg (147 lb)     General Appearance:   Alert, cooperative and in no acute distress. " "  ENT/Mouth: Patient wearing a mask.      EYES:  no scleral icterus, normal conjunctivae   Neck: JVP normal. No Hepatojugular reflux. Thyroid not visualized.   Chest/Lungs:   Lungs are clear to auscultation, equal chest wall expansion.   Cardiovascular:   S1, S2 with 1/6 systolic murmur , no clicks or rubs. Brachial, radial  pulses are intact and symetric. No carotid bruits noted   Abdomen:  Nontender. BS+.   Extremities: No cyanosis, clubbing or edema   Skin: no xanthelasma, warm.    Neurologic: normal arm movement bilateral, no tremors     Psychiatric: Appropriate affect.      Enc Vitals  BP: 124/70  Pulse: 62  Resp: 20  Temp: 98.1  F (36.7  C)  Temp src: Oral  SpO2: 98 %  Weight: 67.4 kg (148 lb 9.6 oz)  Height: 160.4 cm (5' 3.15\")  Pain Score: No Pain (0)  Enc Vitals  Pain Score: No Pain (0)                                        Medical History  Surgical History Family History Social History   No past medical history on file. No past surgical history on file. Family History   Problem Relation Age of Onset     Heart Failure Mother      Atrial fibrillation Daughter     Social History     Socioeconomic History     Marital status:      Spouse name: Not on file     Number of children: Not on file     Years of education: Not on file     Highest education level: Not on file   Occupational History     Not on file   Tobacco Use     Smoking status: Never     Smokeless tobacco: Never   Substance and Sexual Activity     Alcohol use: Not on file     Drug use: Not on file     Sexual activity: Not on file   Other Topics Concern     Not on file   Social History Narrative     Not on file     Social Determinants of Health     Financial Resource Strain: Not on file   Food Insecurity: Not on file   Transportation Needs: Not on file   Physical Activity: Not on file   Stress: Not on file   Social Connections: Not on file   Intimate Partner Violence: Not on file   Housing Stability: Not on file          Medications  Allergies "   Current Outpatient Medications   Medication Sig Dispense Refill     ascorbic acid 1000 MG TABS tablet Take 1,000 mg by mouth       aspirin 325 MG tablet [ASPIRIN 325 MG TABLET] Take 325 mg by mouth daily.       atenoloL (TENORMIN) 50 MG tablet [ATENOLOL (TENORMIN) 50 MG TABLET] TAKE ONE TABLET BY MOUTH EVERY DAY 90 tablet 0     calcium carbonate (OS-ROSALIND) 500 MG tablet Take 1 tablet by mouth 2 times daily       Calcium Carbonate-Vitamin D (OYSTER SHELL CALCIUM/D) 500-5 MG-MCG TABS Take 1 tablet by mouth       diphenhydrAMINE (BENADRYL) 25 MG tablet Take 25 mg by mouth       fish oil-omega-3 fatty acids 1000 MG capsule Take 2 g by mouth daily       Flavoring Agent (PEPPERMINT FLAVOR) OIL        gabapentin (NEURONTIN) 100 MG capsule        ibuprofen (ADVIL/MOTRIN) 200 MG tablet Take 200 mg by mouth       magnesium gluconate (MAGONATE) 500 (27 Mg) MG tablet Take 500 mg by mouth 2 times daily       melatonin 5 MG tablet Take 5 mg by mouth       oxyCODONE (ROXICODONE) 5 MG tablet Take 2.5-5 mg by mouth       VENTOLIN HFA 90 mcg/actuation inhaler [VENTOLIN HFA 90 MCG/ACTUATION INHALER]        atenolol (TENORMIN) 50 MG tablet TAKE ONE (1) TABLET (50 MG) BY MOUTH DAILY 90 tablet 0     ibuprofen (ADVIL,MOTRIN) 200 MG tablet Take 200 mg by mouth every 8 hours as needed      Allergies   Allergen Reactions     Mold [Molds & Smuts] Other (See Comments) and Cough     Asthma.     Sulfamethoxazole-Trimethoprim [Sulfamethoxazole W/Trimethoprim] Rash         Lab Results    Chemistry/lipid CBC Cardiac Enzymes/BNP/TSH/INR   No results found for: CHOL, HDL, TRIG, CHOLHDL, CREATININE, BUN, NA, CO2 No results found for: WBC, HGB, HCT, MCV, PLT No results found for: CKTOTAL, CKMB, TROPONINI, BNP, TSH, INR

## 2023-02-16 NOTE — LETTER
"2/16/2023    Honey Galvez MD  [facility] [address]  [facility] [address]    RE: Annamarie Calvin       Dear Colleague,     I had the pleasure of seeing Annamarie Calvin in the [facility].      [facility]  [phone]          Assessment/Recommendations   Patient with known history of mitral insufficiency which on last echocardiogram was judged to be mild.  She is also had palpitations and a distant history of atrial fibrillation but no symptoms of palpitation and she actually feels quite well.  She is starting a walking program and can do 2 miles without difficulty.    I have not made any changes in her current medications.  I have encouraged her to maintain an active lifestyle and to call if she has changes in her functional capacity.  We will see her back in 1 year and renew her atenolol today.    Thank you for allowing us to participate in her care.       History of Present Illness/Subjective    Ms. Annamarie Calvin is a 77 year old female with known history of mitral insufficiency, and a distant history of atrial fibrillation although no documentation for quite some time.  She has not had any significant palpitations and she feels like her palpitations are just gone away.  She feels like she has a lot of energy and did have a period in the fall where she felt more fatigued for couple months.  Thereafter she was documented with COVID but had a quick recovery after taking an antiviral.    She denies orthopnea, paroxysmal nocturnal dyspnea, peripheral edema, syncope or near syncopal episodes and does not have any chest or arm discomfort.       Physical Examination Review of Systems   /70 (BP Location: Right arm, Patient Position: Sitting, Cuff Size: Adult Regular)   Pulse 62   Temp 98.1  F (36.7  C) (Oral)   Resp 20   Ht 1.604 m (5' 3.15\")   Wt 67.4 kg (148 lb 9.6 oz)   LMP  (LMP Unknown)   SpO2 98%   BMI 26.20 kg/m  " "  Body mass index is 26.2 kg/m .  Wt Readings from Last 3 Encounters:   02/16/23 67.4 kg (148 lb 9.6 oz)   11/18/21 66.5 kg (146 lb 9.6 oz)   10/19/20 66.7 kg (147 lb)     General Appearance:   Alert, cooperative and in no acute distress.   ENT/Mouth: Patient wearing a mask.      EYES:  no scleral icterus, normal conjunctivae   Neck: JVP normal. No Hepatojugular reflux. Thyroid not visualized.   Chest/Lungs:   Lungs are clear to auscultation, equal chest wall expansion.   Cardiovascular:   S1, S2 with 1/6 systolic murmur , no clicks or rubs. Brachial, radial  pulses are intact and symetric. No carotid bruits noted   Abdomen:  Nontender. BS+.   Extremities: No cyanosis, clubbing or edema   Skin: no xanthelasma, warm.    Neurologic: normal arm movement bilateral, no tremors     Psychiatric: Appropriate affect.      Enc Vitals  BP: 124/70  Pulse: 62  Resp: 20  Temp: 98.1  F (36.7  C)  Temp src: Oral  SpO2: 98 %  Weight: 67.4 kg (148 lb 9.6 oz)  Height: 160.4 cm (5' 3.15\")  Pain Score: No Pain (0)  Enc Vitals  Pain Score: No Pain (0)                                        Medical History  Surgical History Family History Social History   No past medical history on file. No past surgical history on file. Family History   Problem Relation Age of Onset     Heart Failure Mother      Atrial fibrillation Daughter     Social History     Socioeconomic History     Marital status:      Spouse name: Not on file     Number of children: Not on file     Years of education: Not on file     Highest education level: Not on file   Occupational History     Not on file   Tobacco Use     Smoking status: Never     Smokeless tobacco: Never   Substance and Sexual Activity     Alcohol use: Not on file     Drug use: Not on file     Sexual activity: Not on file   Other Topics Concern     Not on file   Social History Narrative     Not on file     Social Determinants of Health     Financial Resource Strain: Not on file   Food Insecurity: Not " on file   Transportation Needs: Not on file   Physical Activity: Not on file   Stress: Not on file   Social Connections: Not on file   Intimate Partner Violence: Not on file   Housing Stability: Not on file          Medications  Allergies   Current Outpatient Medications   Medication Sig Dispense Refill     ascorbic acid 1000 MG TABS tablet Take 1,000 mg by mouth       aspirin 325 MG tablet [ASPIRIN 325 MG TABLET] Take 325 mg by mouth daily.       atenoloL (TENORMIN) 50 MG tablet [ATENOLOL (TENORMIN) 50 MG TABLET] TAKE ONE TABLET BY MOUTH EVERY DAY 90 tablet 0     calcium carbonate (OS-ROSALIND) 500 MG tablet Take 1 tablet by mouth 2 times daily       Calcium Carbonate-Vitamin D (OYSTER SHELL CALCIUM/D) 500-5 MG-MCG TABS Take 1 tablet by mouth       diphenhydrAMINE (BENADRYL) 25 MG tablet Take 25 mg by mouth       fish oil-omega-3 fatty acids 1000 MG capsule Take 2 g by mouth daily       Flavoring Agent (PEPPERMINT FLAVOR) OIL        gabapentin (NEURONTIN) 100 MG capsule        ibuprofen (ADVIL/MOTRIN) 200 MG tablet Take 200 mg by mouth       magnesium gluconate (MAGONATE) 500 (27 Mg) MG tablet Take 500 mg by mouth 2 times daily       melatonin 5 MG tablet Take 5 mg by mouth       oxyCODONE (ROXICODONE) 5 MG tablet Take 2.5-5 mg by mouth       VENTOLIN HFA 90 mcg/actuation inhaler [VENTOLIN HFA 90 MCG/ACTUATION INHALER]        atenolol (TENORMIN) 50 MG tablet TAKE ONE (1) TABLET (50 MG) BY MOUTH DAILY 90 tablet 0     ibuprofen (ADVIL,MOTRIN) 200 MG tablet Take 200 mg by mouth every 8 hours as needed      Allergies   Allergen Reactions     Mold [Molds & Smuts] Other (See Comments) and Cough     Asthma.     Sulfamethoxazole-Trimethoprim [Sulfamethoxazole W/Trimethoprim] Rash         Lab Results    Chemistry/lipid CBC Cardiac Enzymes/BNP/TSH/INR   No results found for: CHOL, HDL, TRIG, CHOLHDL, CREATININE, BUN, NA, CO2 No results found for: WBC, HGB, HCT, MCV, PLT No results found for: CKTOTAL, CKMB, TROPONINI, BNP, TSH,  INR           Thank you for allowing me to participate in the care of your patient.      Sincerely,     Jimmy Verduzco MD     [facility]  cc:   Jimmy Verduzco MD  [address]  [place] [address]

## 2023-06-16 ENCOUNTER — TELEPHONE (OUTPATIENT)
Dept: CARDIOLOGY | Facility: CLINIC | Age: 78
End: 2023-06-16
Payer: MEDICARE

## 2023-06-16 NOTE — TELEPHONE ENCOUNTER
[facility] Call Center    Phone Message    May a detailed message be left on voicemail: yes     Reason for Call: Other: Patient called to let care team know that they did a stress test and results will be faxed over from PCP Honey Galvez's office. Patient will like for Dr. Verduzco to look at the results and see if patient really needs another stress test to be done. Please call patient back to further discuss.      Action Taken: Other: Cardiology    Travel Screening: Not Applicable     Thank you!  [facility]

## 2023-06-20 NOTE — TELEPHONE ENCOUNTER
Spoke with Pt regarding call back request, confirmed the medical records had not been received yet. Pt confirmed     Stress test completed on 06/15/23, will follow up with Pt on Monday with update. Indicated Dr. Verduzco is currently out of the office as well but will continue to review chart for new information. Once received will forward to provider. Pt expressed understanding. -ANDREIA

## 2023-06-27 NOTE — TELEPHONE ENCOUNTER
Spoke with spouse regarding call back request, Pt unavailble. Confirmed records not received. Spouse will inform pt-ANDREIA

## 2023-07-03 ENCOUNTER — TELEPHONE (OUTPATIENT)
Dept: CARDIOLOGY | Facility: CLINIC | Age: 78
End: 2023-07-03
Payer: MEDICARE

## 2023-07-03 NOTE — TELEPHONE ENCOUNTER
Spoke with Pt regarding recommendations from Dr. Verduzco, Pt expressed understanding. No new or worsening symptoms. No audible sob. Pt does not wish to proceed with nuc stress test that was ordered by pcp.  Follow up in place, pt will inform writer of any new or worsening symptoms. -ANDREIA      From: Jimmy Verduzco MD  Sent: 7/3/2023   8:44 AM CDT  To: Carmelita Mae,    It looks like a reasonable test but is if she is having shortness of breath, further testing may be warranted.  We will be happy to see her and talk more about this and get a better idea of regarding her symptoms.    Thanks,    Jimmy  ----- Message -----  From: Carmelita Pedraza  Sent: 7/3/2023   8:33 AM CDT  To: Jimmy Verduzco MD    Hi Dr. Verduzco,    The Pt's pcp ordered the stress test, pt requested your input.    Thank you,    Carmelita  ----- Message -----  From: Jimmy Verduzco MD  Sent: 7/3/2023   7:40 AM CDT  To: Carmelita Mae,    I reviewed the stress test from [employer].  The report says she did not achieve target heart rate although in the body of the report she had a peak heart rate of 123 and the target heart rate was 122.  Not sure why they said she did not reach target heart rate.  Echocardiogram heart is unremarkable which is very comforting.    To know why the stress test was ordered?  I do not believe I ordered it based on my last note.    Jimmy Valentine

## 2024-03-28 DIAGNOSIS — I48.0 PAROXYSMAL ATRIAL FIBRILLATION (H): ICD-10-CM

## 2024-03-29 RX ORDER — ATENOLOL 50 MG/1
TABLET ORAL
Qty: 90 TABLET | Refills: 1 | Status: SHIPPED | OUTPATIENT
Start: 2024-03-29 | End: 2024-09-17

## 2024-05-16 NOTE — PROGRESS NOTES
HEART CARE ENCOUNTER CONSULTATON NOTE      [facility]  [phone]      Assessment/Recommendations   Assessment:   Distant history paroxysmal atrial fibrillation, not on anticoagulation per cardiologist  - Describes episodes of palpitations lasting about an hour at most every 2 months.  Sensation of irregular rhythm.  This is unchanged for several years according to patient.  Concern for atrial fibrillation recurrence. CHADVASc 3 (age, female) discussed she would be recommended OAC for her increased risk of stroke if having atrial fibrillation.  Patient elects to continue 325 mg daily aspirin and accepts risk of stroke, did not tolerate warfarin well in the past.  Palpitations/PVCs: Stable on atenolol, magnesium   Mitral insufficiency: Mild on echo 2021, stable from 2016  Dyspnea on exertion: Intermittent, and seems related to low blood pressure in the morning hours according to patient.  Nonischemic echocardiogram stress test with baseline ECG changes 6/2023 LVEF 60%  Cramps in legs: Multiple nights a week, not similar to claudication pain    Plan:   Discussed [employer] versus Apple Watch monitoring with extended palpitations.  Concern for recurrent A-fib. With infrequent symptoms cardiac rhythm monitor may not find answers. Patient would likely elect to to continue aspirin 325 mg once with recurrence daily despite conversation regarding increased risk.  Briefly discussed DOAC versus warfarin options.  Continue atenolol 50 mg daily for palpitations/PVCs, discussed half dose of 25 mg twice daily if patient is experiencing low blood pressure.  Update BMP/Mg with report of cramps   Continue exercise, stay hydrated        Follow up in 1 year or sooner sasneeded     History of Present Illness/Subjective    HPI: Annamarie NOLVIA Calvin is a 78 year old female with PMHx of distant history AF, palpitations/PVCs, mitral insufficiency, cramps in legs presents for follow up.    Patient reports no  significant changes over the last year.  Continues to have occasional palpitations, and can feel an irregular pulse on her risk intermittently.  She also describes episodes of sensation of irregular heartbeat, sensation in chest/stomach without known trigger, can happen at rest, occurring about every 2 months or less.  Longest episode has lasted for the whole day but this is very rare.  Historically with atrial fibrillation, apparently diagnosed in her 30s, she felt incredibly weak and fatigued.  There is no mention of these episodes at visit last year, but patient says they have been the same and stable for many years.  Palpitations may even be better than a year ago on atenolol and magnesium supplementation.  Felt poorly when taking warfarin in the past.  Send daily full dose aspirin.    Dyspnea on exertion and diagnostic echo stress test 6/2023 to baseline ECG changes and failure to achieve target heart rate.  LVEF 60% and no WMA at rest or after stress.  Experience shortness of breath with stress.  When reviewed by Dr. Verduzco and that she did not achieve target heart rate of 122, with peak heart rate that 123.  Since patient reports some shortness of breath usually occurring in the mornings about every 10 days, and has since low blood pressure at this time.  He takes atenolol at night.    Scribes intermittent leg cramps at night when laying in bed.  Drinks 5 to 6 glasses of water a day.  Denies claudication symptoms with exertion/exercise.  She exercises regularly with walking.    She denies dyspnea on exertion, orthopnea, PND, chest pain, and lower extremity edema.      Echocardiogram 12/2021 Results:  The left ventricle is normal in size.  The visual ejection fraction is 55-60%.  Normal left ventricular wall motion  Normal right ventricle size and systolic function.  There is mild (1+) tricuspid regurgitation.  The right ventricular systolic pressure is approximated at 27mmHg plus the  right atrial  pressure.  IVC diameter <2.1 cm collapsing >50% with sniff suggests a normal RA pressure  of 3 mmHg.  There is no pericardial effusion.  Sinus rhythm was noted.     Physical Examination  Review of Systems   Vitals: /70 (BP Location: Left arm, Patient Position: Sitting, Cuff Size: Adult Regular)   Pulse 67   Resp 16   Wt 67 kg (147 lb 12.8 oz)   LMP  (LMP Unknown)   SpO2 97%   BMI 26.06 kg/m    BMI= Body mass index is 26.06 kg/m .  Wt Readings from Last 3 Encounters:   05/17/24 67 kg (147 lb 12.8 oz)   02/16/23 67.4 kg (148 lb 9.6 oz)   11/18/21 66.5 kg (146 lb 9.6 oz)           ENT/Mouth: membranes moist, no oral lesions or bleeding gums.              Chest/Lungs:   lungs are clear to auscultation, no rales or wheezing, equal chest wall expansion    Cardiovascular:   Regular. Normal first and second heart sounds with no murmurs, rubs, or gallops; the  radial and posterior tibial pulses are intact, absent edema bilaterally        Extremities: no cyanosis or clubbing   Skin: no xanthelasma, warm.    Neurologic: no tremors     Psychiatric: alert and oriented x3, calm        Please refer above for cardiac ROS details.        Medical History  Surgical History Family History Social History   No past medical history on file.  No past surgical history on file.  Family History   Problem Relation Age of Onset    Heart Failure Mother     Atrial fibrillation Daughter         Social History     Socioeconomic History    Marital status:      Spouse name: Not on file    Number of children: Not on file    Years of education: Not on file    Highest education level: Not on file   Occupational History    Not on file   Tobacco Use    Smoking status: Never    Smokeless tobacco: Never   Substance and Sexual Activity    Alcohol use: Not on file    Drug use: Not on file    Sexual activity: Not on file   Other Topics Concern    Not on file   Social History Narrative    Not on file     Social Determinants of Health  "    Financial Resource Strain: Not on file   Food Insecurity: Not on file   Transportation Needs: Not on file   Physical Activity: Not on file   Stress: Not on file   Social Connections: Not on file   Interpersonal Safety: Not on file   Housing Stability: Not on file           Medications  Allergies   Current Outpatient Medications   Medication Sig Dispense Refill    ascorbic acid 1000 MG TABS tablet Take 1,000 mg by mouth      aspirin 325 MG tablet [ASPIRIN 325 MG TABLET] Take 325 mg by mouth daily.      atenolol (TENORMIN) 50 MG tablet TAKE ONE (1) TABLET (50 MG) BY MOUTH DAILY 90 tablet 1    calcium carbonate (OS-ROSALIND) 500 MG tablet Take 1 tablet by mouth 2 times daily      Calcium Carbonate-Vitamin D (OYSTER SHELL CALCIUM/D) 500-5 MG-MCG TABS Take 1 tablet by mouth      diphenhydrAMINE (BENADRYL) 25 MG tablet Take 25 mg by mouth      fish oil-omega-3 fatty acids 1000 MG capsule Take 2 g by mouth daily      Flavoring Agent (PEPPERMINT FLAVOR) OIL       ibuprofen (ADVIL,MOTRIN) 200 MG tablet Take 200 mg by mouth every 8 hours as needed      ibuprofen (ADVIL/MOTRIN) 200 MG tablet Take 200 mg by mouth      magnesium gluconate (MAGONATE) 500 (27 Mg) MG tablet Take 500 mg by mouth 2 times daily      melatonin 5 MG tablet Take 5 mg by mouth      VENTOLIN HFA 90 mcg/actuation inhaler [VENTOLIN HFA 90 MCG/ACTUATION INHALER]       gabapentin (NEURONTIN) 100 MG capsule  (Patient not taking: Reported on 5/17/2024)      oxyCODONE (ROXICODONE) 5 MG tablet Take 2.5-5 mg by mouth (Patient not taking: Reported on 5/17/2024)         Allergies   Allergen Reactions    Mold [Molds & Smuts] Other (See Comments) and Cough     Asthma.    Sulfamethoxazole-Trimethoprim [Sulfamethoxazole-Trimethoprim] Rash          Lab Results    Chemistry/lipid CBC Cardiac Enzymes/BNP/TSH/INR   No results for input(s): \"CHOL\", \"HDL\", \"LDL\", \"TRIG\", \"CHOLHDLRATIO\" in the last 49202 hours.  No results for input(s): \"LDL\" in the last 60559 hours.  No results " "for input(s): \"NA\", \"POTASSIUM\", \"CHLORIDE\", \"CO2\", \"GLC\", \"BUN\", \"CR\", \"GFRESTIMATED\", \"ROSALIND\" in the last 82041 hours.    Invalid input(s): \"GRFESTBLACK\"  No results for input(s): \"CR\" in the last 85307 hours.  No results for input(s): \"A1C\" in the last 65345 hours.       No results for input(s): \"WBC\", \"HGB\", \"HCT\", \"MCV\", \"PLT\" in the last 96804 hours.  No results for input(s): \"HGB\" in the last 06786 hours. No results for input(s): \"TROPONINI\" in the last 76825 hours.  No results for input(s): \"BNP\", \"NTBNPI\", \"NTBNP\" in the last 64481 hours.  No results for input(s): \"TSH\" in the last 26995 hours.  No results for input(s): \"INR\" in the last 51042 hours.       This note has been dictated using voice recognition software. Any grammatical, typographical, or context distortions are unintentional and inherent to the software    Juanis Starr PA-C                                       "

## 2024-05-17 ENCOUNTER — OFFICE VISIT (OUTPATIENT)
Dept: CARDIOLOGY | Facility: CLINIC | Age: 79
End: 2024-05-17
Payer: MEDICARE

## 2024-05-17 VITALS
OXYGEN SATURATION: 97 % | HEART RATE: 67 BPM | RESPIRATION RATE: 16 BRPM | BODY MASS INDEX: 26.06 KG/M2 | WEIGHT: 147.8 LBS | SYSTOLIC BLOOD PRESSURE: 106 MMHG | DIASTOLIC BLOOD PRESSURE: 70 MMHG

## 2024-05-17 DIAGNOSIS — I48.0 PAROXYSMAL ATRIAL FIBRILLATION (H): ICD-10-CM

## 2024-05-17 DIAGNOSIS — R25.2 CRAMP OF LIMB: ICD-10-CM

## 2024-05-17 DIAGNOSIS — I49.3 PVC'S (PREMATURE VENTRICULAR CONTRACTIONS): Primary | ICD-10-CM

## 2024-05-17 DIAGNOSIS — I05.9 MITRAL VALVE DISORDER: ICD-10-CM

## 2024-05-17 DIAGNOSIS — R00.2 PALPITATIONS: ICD-10-CM

## 2024-05-17 LAB
ANION GAP SERPL CALCULATED.3IONS-SCNC: 6 MMOL/L (ref 7–15)
BUN SERPL-MCNC: 14.6 MG/DL (ref 8–23)
CALCIUM SERPL-MCNC: 9.5 MG/DL (ref 8.8–10.2)
CHLORIDE SERPL-SCNC: 104 MMOL/L (ref 98–107)
CREAT SERPL-MCNC: 0.74 MG/DL (ref 0.51–0.95)
DEPRECATED HCO3 PLAS-SCNC: 27 MMOL/L (ref 22–29)
EGFRCR SERPLBLD CKD-EPI 2021: 82 ML/MIN/1.73M2
GLUCOSE SERPL-MCNC: 91 MG/DL (ref 70–99)
MAGNESIUM SERPL-MCNC: 2.4 MG/DL (ref 1.7–2.3)
POTASSIUM SERPL-SCNC: 4.8 MMOL/L (ref 3.4–5.3)
SODIUM SERPL-SCNC: 137 MMOL/L (ref 135–145)

## 2024-05-17 PROCEDURE — 36415 COLL VENOUS BLD VENIPUNCTURE: CPT

## 2024-05-17 PROCEDURE — 83735 ASSAY OF MAGNESIUM: CPT

## 2024-05-17 PROCEDURE — 99204 OFFICE O/P NEW MOD 45 MIN: CPT

## 2024-05-17 PROCEDURE — 80048 BASIC METABOLIC PNL TOTAL CA: CPT

## 2024-05-17 NOTE — PATIENT INSTRUCTIONS
It was a pleasure taking part in your care today:    - Continue atenolol 50 mg once daily. Can take half a pill (25 mg) twice daily if having low blood pressure  - [employer] or Apple watch, to monitor for atrial fibrillation with palpitations every two months   - Continue to monitor palpitations for increased frequency     Please call the [facility] clinic with any questions or concerns at [phone].     Juanis Starr PA-C

## 2024-05-17 NOTE — LETTER
5/17/2024    Honey Galvez MD  [address] [facility] [address]    RE: Annamarie Calvin       Dear Colleague,     I had the pleasure of seeing Annamarie Calvin in the [facility].    HEART CARE ENCOUNTER CONSULTATON NOTE      [facility]  [phone]      Assessment/Recommendations   Assessment:   Distant history paroxysmal atrial fibrillation, not on anticoagulation per cardiologist  - Describes episodes of palpitations lasting about an hour at most every 2 months.  Sensation of irregular rhythm.  This is unchanged for several years according to patient.  Concern for atrial fibrillation recurrence. CHADVASc 3 (age, female) discussed she would be recommended OAC for her increased risk of stroke if having atrial fibrillation.  Patient elects to continue 325 mg daily aspirin and accepts risk of stroke, did not tolerate warfarin well in the past.  Palpitations/PVCs: Stable on atenolol, magnesium   Mitral insufficiency: Mild on echo 2021, stable from 2016  Dyspnea on exertion: Intermittent, and seems related to low blood pressure in the morning hours according to patient.  Nonischemic echocardiogram stress test with baseline ECG changes 6/2023 LVEF 60%  Cramps in legs: Multiple nights a week, not similar to claudication pain    Plan:   Discussed [employer] versus Apple Watch monitoring with extended palpitations.  Concern for recurrent A-fib. With infrequent symptoms cardiac rhythm monitor may not find answers. Patient would likely elect to to continue aspirin 325 mg once with recurrence daily despite conversation regarding increased risk.  Briefly discussed DOAC versus warfarin options.  Continue atenolol 50 mg daily for palpitations/PVCs, discussed half dose of 25 mg twice daily if patient is experiencing low blood pressure.  Update BMP/Mg with report of cramps   Continue exercise, stay hydrated        Follow up in 1 year or sooner sasneeded     History  of Present Illness/Subjective    HPI: Annamarie Calvin is a 78 year old female with PMHx of distant history AF, palpitations/PVCs, mitral insufficiency, cramps in legs presents for follow up.    Patient reports no significant changes over the last year.  Continues to have occasional palpitations, and can feel an irregular pulse on her risk intermittently.  She also describes episodes of sensation of irregular heartbeat, sensation in chest/stomach without known trigger, can happen at rest, occurring about every 2 months or less.  Longest episode has lasted for the whole day but this is very rare.  Historically with atrial fibrillation, apparently diagnosed in her 30s, she felt incredibly weak and fatigued.  There is no mention of these episodes at visit last year, but patient says they have been the same and stable for many years.  Palpitations may even be better than a year ago on atenolol and magnesium supplementation.  Felt poorly when taking warfarin in the past.  Send daily full dose aspirin.    Dyspnea on exertion and diagnostic echo stress test 6/2023 to baseline ECG changes and failure to achieve target heart rate.  LVEF 60% and no WMA at rest or after stress.  Experience shortness of breath with stress.  When reviewed by Dr. Verduzco and that she did not achieve target heart rate of 122, with peak heart rate that 123.  Since patient reports some shortness of breath usually occurring in the mornings about every 10 days, and has since low blood pressure at this time.  He takes atenolol at night.    Scribes intermittent leg cramps at night when laying in bed.  Drinks 5 to 6 glasses of water a day.  Denies claudication symptoms with exertion/exercise.  She exercises regularly with walking.    She denies dyspnea on exertion, orthopnea, PND, chest pain, and lower extremity edema.      Echocardiogram 12/2021 Results:  The left ventricle is normal in size.  The visual ejection fraction is 55-60%.  Normal left  ventricular wall motion  Normal right ventricle size and systolic function.  There is mild (1+) tricuspid regurgitation.  The right ventricular systolic pressure is approximated at 27mmHg plus the  right atrial pressure.  IVC diameter <2.1 cm collapsing >50% with sniff suggests a normal RA pressure  of 3 mmHg.  There is no pericardial effusion.  Sinus rhythm was noted.     Physical Examination  Review of Systems   Vitals: /70 (BP Location: Left arm, Patient Position: Sitting, Cuff Size: Adult Regular)   Pulse 67   Resp 16   Wt 67 kg (147 lb 12.8 oz)   LMP  (LMP Unknown)   SpO2 97%   BMI 26.06 kg/m    BMI= Body mass index is 26.06 kg/m .  Wt Readings from Last 3 Encounters:   05/17/24 67 kg (147 lb 12.8 oz)   02/16/23 67.4 kg (148 lb 9.6 oz)   11/18/21 66.5 kg (146 lb 9.6 oz)           ENT/Mouth: membranes moist, no oral lesions or bleeding gums.              Chest/Lungs:   lungs are clear to auscultation, no rales or wheezing, equal chest wall expansion    Cardiovascular:   Regular. Normal first and second heart sounds with no murmurs, rubs, or gallops; the  radial and posterior tibial pulses are intact, absent edema bilaterally        Extremities: no cyanosis or clubbing   Skin: no xanthelasma, warm.    Neurologic: no tremors     Psychiatric: alert and oriented x3, calm        Please refer above for cardiac ROS details.        Medical History  Surgical History Family History Social History   No past medical history on file.  No past surgical history on file.  Family History   Problem Relation Age of Onset    Heart Failure Mother     Atrial fibrillation Daughter         Social History     Socioeconomic History    Marital status:      Spouse name: Not on file    Number of children: Not on file    Years of education: Not on file    Highest education level: Not on file   Occupational History    Not on file   Tobacco Use    Smoking status: Never    Smokeless tobacco: Never   Substance and Sexual  Activity    Alcohol use: Not on file    Drug use: Not on file    Sexual activity: Not on file   Other Topics Concern    Not on file   Social History Narrative    Not on file     Social Determinants of Health     Financial Resource Strain: Not on file   Food Insecurity: Not on file   Transportation Needs: Not on file   Physical Activity: Not on file   Stress: Not on file   Social Connections: Not on file   Interpersonal Safety: Not on file   Housing Stability: Not on file           Medications  Allergies   Current Outpatient Medications   Medication Sig Dispense Refill    ascorbic acid 1000 MG TABS tablet Take 1,000 mg by mouth      aspirin 325 MG tablet [ASPIRIN 325 MG TABLET] Take 325 mg by mouth daily.      atenolol (TENORMIN) 50 MG tablet TAKE ONE (1) TABLET (50 MG) BY MOUTH DAILY 90 tablet 1    calcium carbonate (OS-ROSALIND) 500 MG tablet Take 1 tablet by mouth 2 times daily      Calcium Carbonate-Vitamin D (OYSTER SHELL CALCIUM/D) 500-5 MG-MCG TABS Take 1 tablet by mouth      diphenhydrAMINE (BENADRYL) 25 MG tablet Take 25 mg by mouth      fish oil-omega-3 fatty acids 1000 MG capsule Take 2 g by mouth daily      Flavoring Agent (PEPPERMINT FLAVOR) OIL       ibuprofen (ADVIL,MOTRIN) 200 MG tablet Take 200 mg by mouth every 8 hours as needed      ibuprofen (ADVIL/MOTRIN) 200 MG tablet Take 200 mg by mouth      magnesium gluconate (MAGONATE) 500 (27 Mg) MG tablet Take 500 mg by mouth 2 times daily      melatonin 5 MG tablet Take 5 mg by mouth      VENTOLIN HFA 90 mcg/actuation inhaler [VENTOLIN HFA 90 MCG/ACTUATION INHALER]       gabapentin (NEURONTIN) 100 MG capsule  (Patient not taking: Reported on 5/17/2024)      oxyCODONE (ROXICODONE) 5 MG tablet Take 2.5-5 mg by mouth (Patient not taking: Reported on 5/17/2024)         Allergies   Allergen Reactions    Mold [Molds & Smuts] Other (See Comments) and Cough     Asthma.    Sulfamethoxazole-Trimethoprim [Sulfamethoxazole-Trimethoprim] Rash          Lab Results   "  Chemistry/lipid CBC Cardiac Enzymes/BNP/TSH/INR   No results for input(s): \"CHOL\", \"HDL\", \"LDL\", \"TRIG\", \"CHOLHDLRATIO\" in the last 64768 hours.  No results for input(s): \"LDL\" in the last 43778 hours.  No results for input(s): \"NA\", \"POTASSIUM\", \"CHLORIDE\", \"CO2\", \"GLC\", \"BUN\", \"CR\", \"GFRESTIMATED\", \"ROSALIND\" in the last 66083 hours.    Invalid input(s): \"GRFESTBLACK\"  No results for input(s): \"CR\" in the last 05006 hours.  No results for input(s): \"A1C\" in the last 11976 hours.       No results for input(s): \"WBC\", \"HGB\", \"HCT\", \"MCV\", \"PLT\" in the last 78022 hours.  No results for input(s): \"HGB\" in the last 25821 hours. No results for input(s): \"TROPONINI\" in the last 95515 hours.  No results for input(s): \"BNP\", \"NTBNPI\", \"NTBNP\" in the last 51280 hours.  No results for input(s): \"TSH\" in the last 63383 hours.  No results for input(s): \"INR\" in the last 74034 hours.       This note has been dictated using voice recognition software. Any grammatical, typographical, or context distortions are unintentional and inherent to the software    Juanis Starr PA-C    Thank you for allowing me to participate in the care of your patient.      Sincerely,     Juanis Miller PA-C     [facility]  cc:   Jimmy Verduzco MD  [address]  [place] [address]      "

## 2024-05-23 ENCOUNTER — TELEPHONE (OUTPATIENT)
Dept: CARDIOLOGY | Facility: CLINIC | Age: 79
End: 2024-05-23
Payer: MEDICARE

## 2024-05-23 NOTE — TELEPHONE ENCOUNTER
[facility]    Phone Message    May a detailed message be left on voicemail: yes     Reason for Call: Other: Pt returning call for recent results.     Action Taken: Other: Cardiology    Travel Screening: Not Applicable    Thank you!  [facility]

## 2024-09-17 DIAGNOSIS — I48.0 PAROXYSMAL ATRIAL FIBRILLATION (H): ICD-10-CM

## 2024-09-17 RX ORDER — ATENOLOL 50 MG/1
TABLET ORAL
Qty: 90 TABLET | Refills: 1 | Status: SHIPPED | OUTPATIENT
Start: 2024-09-17

## 2024-11-19 ENCOUNTER — OFFICE VISIT (OUTPATIENT)
Dept: CARDIOLOGY | Facility: CLINIC | Age: 79
End: 2024-11-19
Payer: MEDICARE

## 2024-11-19 VITALS
WEIGHT: 147 LBS | BODY MASS INDEX: 25.92 KG/M2 | DIASTOLIC BLOOD PRESSURE: 62 MMHG | OXYGEN SATURATION: 97 % | HEART RATE: 68 BPM | SYSTOLIC BLOOD PRESSURE: 108 MMHG

## 2024-11-19 DIAGNOSIS — R00.2 PALPITATIONS: ICD-10-CM

## 2024-11-19 DIAGNOSIS — I49.3 PVC'S (PREMATURE VENTRICULAR CONTRACTIONS): ICD-10-CM

## 2024-11-19 DIAGNOSIS — I05.9 MITRAL VALVE DISORDER: Primary | ICD-10-CM

## 2024-11-19 PROCEDURE — G2211 COMPLEX E/M VISIT ADD ON: HCPCS | Performed by: INTERNAL MEDICINE

## 2024-11-19 PROCEDURE — 99214 OFFICE O/P EST MOD 30 MIN: CPT | Performed by: INTERNAL MEDICINE

## 2024-11-19 RX ORDER — VALACYCLOVIR HYDROCHLORIDE 1 G/1
TABLET, FILM COATED ORAL
COMMUNITY
Start: 2024-10-22

## 2024-11-19 NOTE — LETTER
11/19/2024    Honey Galvez MD  [address] [facility] [address]    RE: Annamarie Calvin       Dear Colleague,     I had the pleasure of seeing Annamarie Calvin in the [facility].      [facility]  [phone]          Assessment/Recommendations   Patient with known history of irregular heartbeat, PVCs, very distant history of atrial fibrillation and also mitral insufficiency.  She has been having a bit more skipped beats and she notices these by using an oximeter monitor but sometimes also gets palpitations.  No syncopal or near syncopal episodes and her functional capacity is stable.    It has been 3 years since we checked an echocardiogram so we will repeat an ultrasound of her heart and also do a 3-day monitor to categorize the irregular heartbeat.  We will get back to her with the results of the above testing and further recommendations.    Encouraged her to maintain an active lifestyle as she walks at least a mile and a half every day.    Will likely recommend that she get a fasting lipid panel and could also consider a calcium score.    Thanks,    Jimmy  Thank you for allowing us to participate in her care.    The longitudinal plan of care for the diagnosis(es)/condition(s) as documented were addressed during this visit. Due to the added complexity in care, I will continue to support Annamarie in the subsequent management and with ongoing continuity of care.        History of Present Illness/Subjective    Ms. Annamarie Calvin is a 79 year old female with known mitral insufficiency, palpitations, and a distant history of atrial fibrillation.  She has been feeling reasonably well and continues to walk a mile and a half or more each day.  She has no unusual shortness of breath with activity and does not get chest discomfort.  She does notice that her heart is more irregular and notices this by using her 's O2 sat monitor.  She can feel  palpitations at times, especially if she is resting quietly.  She has not had any syncopal or near syncopal episodes.  She denies orthopnea, paroxysmal nocturnal dyspnea, peripheral edema.  She has not had a recent lipid panel.          Physical Examination Review of Systems   /62 (BP Location: Left arm, Patient Position: Sitting, Cuff Size: Adult Regular)   Pulse 68   Wt 66.7 kg (147 lb)   LMP  (LMP Unknown)   SpO2 97%   BMI 25.92 kg/m    Body mass index is 25.92 kg/m .  Wt Readings from Last 3 Encounters:   11/19/24 66.7 kg (147 lb)   05/17/24 67 kg (147 lb 12.8 oz)   02/16/23 67.4 kg (148 lb 9.6 oz)     General Appearance:   Alert, cooperative and in no acute distress.   ENT/Mouth: Pink/moist oral mucosa   EYES:  no scleral icterus, normal conjunctivae   Neck: JVP normal. No Hepatojugular reflux. Thyroid not visualized.   Chest/Lungs:   Lungs are clear to auscultation, equal chest wall expansion.   Cardiovascular:   S1, S2 with 2/6 systolic murmur , no clicks or rubs. Brachial, radial and posterior tibial pulses are intact and symetric. No carotid bruits noted   Abdomen:  Nontender.    Extremities: No cyanosis, clubbing or edema   Skin: no xanthelasma, warm.    Neurologic: normal arm movement bilateral, no tremors     Psychiatric: Appropriate affect.      Encounter Vitals  BP: 108/62  Pulse: 68  SpO2: 97 %  Weight: 66.7 kg (147 lb)                                           Medical History  Surgical History Family History Social History   No past medical history on file. No past surgical history on file. Family History   Problem Relation Age of Onset     Heart Failure Mother      Atrial fibrillation Daughter     Social History     Socioeconomic History     Marital status:      Spouse name: Not on file     Number of children: Not on file     Years of education: Not on file     Highest education level: Not on file   Occupational History     Not on file   Tobacco Use     Smoking status: Never      Smokeless tobacco: Never   Substance and Sexual Activity     Alcohol use: Not on file     Drug use: Not on file     Sexual activity: Not on file   Other Topics Concern     Not on file   Social History Narrative     Not on file     Social Drivers of Health     Financial Resource Strain: Not on file   Food Insecurity: Not on file   Transportation Needs: Not on file   Physical Activity: Not on file   Stress: Not on file   Social Connections: Not on file   Interpersonal Safety: Not on file   Housing Stability: Not on file          Medications  Allergies   Current Outpatient Medications   Medication Sig Dispense Refill     ascorbic acid 1000 MG TABS tablet Take 1,000 mg by mouth       aspirin 325 MG tablet [ASPIRIN 325 MG TABLET] Take 325 mg by mouth daily.       atenolol (TENORMIN) 50 MG tablet TAKE ONE (1) TABLET (50 MG) BY MOUTH DAILY 90 tablet 1     calcium carbonate (OS-ROSALIND) 500 MG tablet Take 1 tablet by mouth 2 times daily       Calcium Carbonate-Vitamin D (OYSTER SHELL CALCIUM/D) 500-5 MG-MCG TABS Take 1 tablet by mouth       diphenhydrAMINE (BENADRYL) 25 MG tablet Take 25 mg by mouth       fish oil-omega-3 fatty acids 1000 MG capsule Take 2 g by mouth daily       Flavoring Agent (PEPPERMINT FLAVOR) OIL        ibuprofen (ADVIL,MOTRIN) 200 MG tablet Take 200 mg by mouth every 8 hours as needed       ibuprofen (ADVIL/MOTRIN) 200 MG tablet Take 200 mg by mouth       magnesium gluconate (MAGONATE) 500 (27 Mg) MG tablet Take 500 mg by mouth 2 times daily       melatonin 5 MG tablet Take 5 mg by mouth       valACYclovir (VALTREX) 1000 mg tablet TAKE TWO (2) TABLETS (2,000 MG) BY MOUTH TWO TIMES A DAY FOR ONE (1) DAY. AS NEEDED FOR COLD SORE       VENTOLIN HFA 90 mcg/actuation inhaler [VENTOLIN HFA 90 MCG/ACTUATION INHALER]       Allergies   Allergen Reactions     Mold [Molds & Smuts] Other (See Comments) and Cough     Asthma.     Sulfamethoxazole-Trimethoprim [Sulfamethoxazole-Trimethoprim] Rash         Lab Results   "  Chemistry/lipid CBC Cardiac Enzymes/BNP/TSH/INR   Lab Results   Component Value Date    BUN 14.6 05/17/2024     05/17/2024    CO2 27 05/17/2024    No results found for: \"WBC\", \"HGB\", \"HCT\", \"MCV\", \"PLT\" No results found for: \"CKTOTAL\", \"CKMB\", \"TROPONINI\", \"BNP\", \"TSH\", \"INR\"                                             Thank you for allowing me to participate in the care of your patient.      Sincerely,     Jimmy Verduzco MD     [facility]  cc:   Referred Self, MD  No address on file      "

## 2024-11-19 NOTE — PROGRESS NOTES
[facility]  [phone]          Assessment/Recommendations   Patient with known history of irregular heartbeat, PVCs, very distant history of atrial fibrillation and also mitral insufficiency.  She has been having a bit more skipped beats and she notices these by using an oximeter monitor but sometimes also gets palpitations.  No syncopal or near syncopal episodes and her functional capacity is stable.    It has been 3 years since we checked an echocardiogram so we will repeat an ultrasound of her heart and also do a 3-day monitor to categorize the irregular heartbeat.  We will get back to her with the results of the above testing and further recommendations.    Encouraged her to maintain an active lifestyle as she walks at least a mile and a half every day.    Will likely recommend that she get a fasting lipid panel and could also consider a calcium score.    Thanks,    Jimmy  Thank you for allowing us to participate in her care.    The longitudinal plan of care for the diagnosis(es)/condition(s) as documented were addressed during this visit. Due to the added complexity in care, I will continue to support Annamarie in the subsequent management and with ongoing continuity of care.        History of Present Illness/Subjective    Ms. Annamarie Calvin is a 79 year old female with known mitral insufficiency, palpitations, and a distant history of atrial fibrillation.  She has been feeling reasonably well and continues to walk a mile and a half or more each day.  She has no unusual shortness of breath with activity and does not get chest discomfort.  She does notice that her heart is more irregular and notices this by using her 's O2 sat monitor.  She can feel palpitations at times, especially if she is resting quietly.  She has not had any syncopal or near syncopal episodes.  She denies orthopnea, paroxysmal nocturnal dyspnea, peripheral edema.  She has not had a recent lipid  panel.          Physical Examination Review of Systems   /62 (BP Location: Left arm, Patient Position: Sitting, Cuff Size: Adult Regular)   Pulse 68   Wt 66.7 kg (147 lb)   LMP  (LMP Unknown)   SpO2 97%   BMI 25.92 kg/m    Body mass index is 25.92 kg/m .  Wt Readings from Last 3 Encounters:   11/19/24 66.7 kg (147 lb)   05/17/24 67 kg (147 lb 12.8 oz)   02/16/23 67.4 kg (148 lb 9.6 oz)     General Appearance:   Alert, cooperative and in no acute distress.   ENT/Mouth: Pink/moist oral mucosa   EYES:  no scleral icterus, normal conjunctivae   Neck: JVP normal. No Hepatojugular reflux. Thyroid not visualized.   Chest/Lungs:   Lungs are clear to auscultation, equal chest wall expansion.   Cardiovascular:   S1, S2 with 2/6 systolic murmur , no clicks or rubs. Brachial, radial and posterior tibial pulses are intact and symetric. No carotid bruits noted   Abdomen:  Nontender.    Extremities: No cyanosis, clubbing or edema   Skin: no xanthelasma, warm.    Neurologic: normal arm movement bilateral, no tremors     Psychiatric: Appropriate affect.      Encounter Vitals  BP: 108/62  Pulse: 68  SpO2: 97 %  Weight: 66.7 kg (147 lb)                                           Medical History  Surgical History Family History Social History   No past medical history on file. No past surgical history on file. Family History   Problem Relation Age of Onset    Heart Failure Mother     Atrial fibrillation Daughter     Social History     Socioeconomic History    Marital status:      Spouse name: Not on file    Number of children: Not on file    Years of education: Not on file    Highest education level: Not on file   Occupational History    Not on file   Tobacco Use    Smoking status: Never    Smokeless tobacco: Never   Substance and Sexual Activity    Alcohol use: Not on file    Drug use: Not on file    Sexual activity: Not on file   Other Topics Concern    Not on file   Social History Narrative    Not on file     Social  "Drivers of Health     Financial Resource Strain: Not on file   Food Insecurity: Not on file   Transportation Needs: Not on file   Physical Activity: Not on file   Stress: Not on file   Social Connections: Not on file   Interpersonal Safety: Not on file   Housing Stability: Not on file          Medications  Allergies   Current Outpatient Medications   Medication Sig Dispense Refill    ascorbic acid 1000 MG TABS tablet Take 1,000 mg by mouth      aspirin 325 MG tablet [ASPIRIN 325 MG TABLET] Take 325 mg by mouth daily.      atenolol (TENORMIN) 50 MG tablet TAKE ONE (1) TABLET (50 MG) BY MOUTH DAILY 90 tablet 1    calcium carbonate (OS-ROSALIND) 500 MG tablet Take 1 tablet by mouth 2 times daily      Calcium Carbonate-Vitamin D (OYSTER SHELL CALCIUM/D) 500-5 MG-MCG TABS Take 1 tablet by mouth      diphenhydrAMINE (BENADRYL) 25 MG tablet Take 25 mg by mouth      fish oil-omega-3 fatty acids 1000 MG capsule Take 2 g by mouth daily      Flavoring Agent (PEPPERMINT FLAVOR) OIL       ibuprofen (ADVIL,MOTRIN) 200 MG tablet Take 200 mg by mouth every 8 hours as needed      ibuprofen (ADVIL/MOTRIN) 200 MG tablet Take 200 mg by mouth      magnesium gluconate (MAGONATE) 500 (27 Mg) MG tablet Take 500 mg by mouth 2 times daily      melatonin 5 MG tablet Take 5 mg by mouth      valACYclovir (VALTREX) 1000 mg tablet TAKE TWO (2) TABLETS (2,000 MG) BY MOUTH TWO TIMES A DAY FOR ONE (1) DAY. AS NEEDED FOR COLD SORE      VENTOLIN HFA 90 mcg/actuation inhaler [VENTOLIN HFA 90 MCG/ACTUATION INHALER]       Allergies   Allergen Reactions    Mold [Molds & Smuts] Other (See Comments) and Cough     Asthma.    Sulfamethoxazole-Trimethoprim [Sulfamethoxazole-Trimethoprim] Rash         Lab Results    Chemistry/lipid CBC Cardiac Enzymes/BNP/TSH/INR   Lab Results   Component Value Date    BUN 14.6 05/17/2024     05/17/2024    CO2 27 05/17/2024    No results found for: \"WBC\", \"HGB\", \"HCT\", \"MCV\", \"PLT\" No results found for: \"CKTOTAL\", \"CKMB\", " "\"TROPONINI\", \"BNP\", \"TSH\", \"INR\"                                         "

## 2024-11-20 ENCOUNTER — ORDERS ONLY (AUTO-RELEASED) (OUTPATIENT)
Dept: CARDIOLOGY | Facility: CLINIC | Age: 79
End: 2024-11-20
Payer: MEDICARE

## 2024-11-20 DIAGNOSIS — I05.9 MITRAL VALVE DISORDER: ICD-10-CM

## 2024-11-20 DIAGNOSIS — I49.3 PVC'S (PREMATURE VENTRICULAR CONTRACTIONS): ICD-10-CM

## 2024-11-20 DIAGNOSIS — R00.2 PALPITATIONS: ICD-10-CM

## 2024-11-21 ENCOUNTER — ANCILLARY PROCEDURE (OUTPATIENT)
Dept: CARDIOLOGY | Facility: CLINIC | Age: 79
End: 2024-11-21
Attending: INTERNAL MEDICINE
Payer: COMMERCIAL

## 2024-11-21 DIAGNOSIS — I49.3 PVC'S (PREMATURE VENTRICULAR CONTRACTIONS): ICD-10-CM

## 2024-11-21 DIAGNOSIS — R00.2 PALPITATIONS: ICD-10-CM

## 2024-11-21 DIAGNOSIS — I05.9 MITRAL VALVE DISORDER: ICD-10-CM

## 2024-11-21 LAB — LVEF ECHO: NORMAL

## 2024-12-09 LAB — CV ZIO PRELIM RESULTS: NORMAL

## 2025-02-25 ENCOUNTER — TELEPHONE (OUTPATIENT)
Dept: CARDIOLOGY | Facility: CLINIC | Age: 80
End: 2025-02-25
Payer: MEDICARE

## 2025-02-25 NOTE — TELEPHONE ENCOUNTER
[facility] Call Center    Phone Message    May a detailed message be left on voicemail: yes     Reason for Call: Symptoms or Concerns     If patient has red-flag symptoms, warm transfer to triage line    Current symptom or concern: dull ache in the left arm    Symptoms have been present for:  off and on for week(s)    Has patient previously been seen for this? No      Are there any new or worsening symptoms? Yes: New, not currently having. Last night it happened and woke her up. Patient does not feel like it is muscle or anything like that. Please reach out. Thank you      Action Taken: Other: cardiology     Travel Screening: Not Applicable    Thank you!  [facility]       Date of Service:

## 2025-02-25 NOTE — TELEPHONE ENCOUNTER
"Dr Verduzco, This patient reporting L arm discomfort for past 2 weeks during sleep which wakes her. See PC for further details. Any new orders at this time?  -sea    =========  PC to patient to discuss arm pain concerns. She reports a dull ache in her inner upper L arm down to her wrist, which occurs most nights after she has been sleeping 1-2 hours, fairly predictable for the past 2 weeks, which she denies could be positional. This discomfort resolves if she gets up and ambulates briefly and is able to sleep uninterrupted after this. She states is different than the \"angina\" she has had in the past, per her report. She notes has occurred on a rare occasion during the day, but not as noticeable as she is fairly active. She takes her atenolol at HS and asks if would be better to take in the a.m.? She also reports had norovirus 2 weeks ago and is asking if related to that? Instructed her to update her PCP as well for non-cardiac considerations, and that update will be fwded to Dr Verduzco and will call with his response. She agrees to plan and has no further concerns at this time.  -sea  "

## 2025-02-27 NOTE — TELEPHONE ENCOUNTER
Patient notified of recommendations per Dr Verduzco and VIRGEN Olivarez. Patient verbalizes understanding and agrees with plan. She reports no discomfort lat night, and will call if returns and is concerning.  No further questions or concerns at this time. -sea     =============  ----- Message -----  From: Jimmy Verduzco MD  Sent: 2/26/2025   5:01 PM CST  To: Jacki Zhang RN    Agree with Juanis, Would try some tylenol or motrin at bedtime for 5 days to see if this helps. We should see her in the clinic if does not go away.    Thanks,    Jimmy    ----- Message -----  From: Juanis Starr PA-C  Sent: 2/26/2025   1:32 PM CST  To: Jacki Zhang RN    This does not seem angina with improvement with activity, not worse when active during the day. She has recent echo with preserved EF and ZIO monitor with no rhythm disturbance, No documented CAD. Can await Dr. Verduzco review, and could considered CT angiogram for evaluation pending his thoughts.        ----- Message -----  From: Jacki Zhang RN  Sent: 2/26/2025   8:03 AM CST  To: Juanis Starr PA-C    ----- Message from Jacki Zhang RN sent at 2/26/2025  8:03 AM CST -----  Juanis, Would you please review this PC from patient reporting atypical CP symptom in Dr Verduzco's absence. Any new orders at this time prior to his return to office next week?  -sea

## 2025-03-17 DIAGNOSIS — I48.0 PAROXYSMAL ATRIAL FIBRILLATION (H): ICD-10-CM

## 2025-03-19 RX ORDER — ATENOLOL 50 MG/1
TABLET ORAL
Qty: 90 TABLET | Refills: 1 | Status: SHIPPED | OUTPATIENT
Start: 2025-03-19